# Patient Record
Sex: FEMALE | Race: WHITE | NOT HISPANIC OR LATINO | Employment: UNEMPLOYED | URBAN - METROPOLITAN AREA
[De-identification: names, ages, dates, MRNs, and addresses within clinical notes are randomized per-mention and may not be internally consistent; named-entity substitution may affect disease eponyms.]

---

## 2017-09-11 ENCOUNTER — OFFICE VISIT (OUTPATIENT)
Dept: FAMILY MEDICINE CLINIC | Facility: CLINIC | Age: 52
End: 2017-09-11

## 2017-09-11 VITALS
TEMPERATURE: 98 F | HEART RATE: 59 BPM | WEIGHT: 200.5 LBS | SYSTOLIC BLOOD PRESSURE: 122 MMHG | BODY MASS INDEX: 34.23 KG/M2 | DIASTOLIC BLOOD PRESSURE: 86 MMHG | HEIGHT: 64 IN | OXYGEN SATURATION: 98 %

## 2017-09-11 DIAGNOSIS — J45.20 MILD INTERMITTENT ASTHMA WITHOUT COMPLICATION: Primary | ICD-10-CM

## 2017-09-11 DIAGNOSIS — Z00.00 HEALTH CARE MAINTENANCE: ICD-10-CM

## 2017-09-11 PROCEDURE — 99213 OFFICE O/P EST LOW 20 MIN: CPT | Performed by: FAMILY MEDICINE

## 2017-09-11 RX ORDER — BUDESONIDE AND FORMOTEROL FUMARATE DIHYDRATE 160; 4.5 UG/1; UG/1
1 AEROSOL RESPIRATORY (INHALATION) 2 TIMES DAILY
Qty: 3 INHALER | Refills: 3 | Status: SHIPPED | OUTPATIENT
Start: 2017-09-11 | End: 2017-09-12 | Stop reason: SDUPTHER

## 2017-09-11 NOTE — PROGRESS NOTES
"Subjective   Jennifer Samayoa is a 51 y.o. female.     Chief Complaint   Patient presents with   • Asthma     medication refills pt not fasting        History of Present Illness    Long-standing mild intermittent asthma.  Since restarting the Symbicort she has had very little use of the Ventolin rescue inhaler.  Maybe 2 or 3 times a year.  She's taking one half of the Symbicort daily, only daily.  No recent flareups.  No direct exposure to secondhand smoke.  Patient has been a never smoker.  No oral thrush.  She follows with her gynecologist also      The following portions of the patient's history were reviewed and updated as appropriate: allergies, current medications, past family history, past medical history, past social history, past surgical history and problem list.          Review of Systems   Constitutional: Negative for fatigue and fever.   HENT: Negative.    Respiratory: Negative for cough and wheezing.    Gastrointestinal: Negative.    Skin: Negative for rash.       Objective   Blood pressure 122/86, pulse 59, temperature 98 °F (36.7 °C), temperature source Oral, height 64\" (162.6 cm), weight 200 lb 8 oz (90.9 kg), SpO2 98 %.  Physical Exam   Constitutional: She appears well-developed and well-nourished. No distress.   HENT:   Mouth/Throat: Oropharynx is clear and moist. No oropharyngeal exudate.   Neck: No thyromegaly present.   Cardiovascular: Normal rate, regular rhythm, normal heart sounds and intact distal pulses.    Pulmonary/Chest: Effort normal and breath sounds normal.   Musculoskeletal: She exhibits no edema.   Skin: Skin is warm and dry.   Psychiatric: She has a normal mood and affect. Her behavior is normal. Judgment and thought content normal.   Nursing note and vitals reviewed.      Assessment/Plan   Jennifer was seen today for asthma.    Diagnoses and all orders for this visit:    Mild intermittent asthma without complication    Health care maintenance  -     Comprehensive Metabolic Panel; " Future  -     Lipid Panel; Future  -     CBC & Differential; Future    Other orders  -     budesonide-formoterol (SYMBICORT) 160-4.5 MCG/ACT inhaler; Inhale 1 puff 2 (Two) Times a Day.      Mild intermittent asthma.  Stable.  Continue Symbicort daily.  Refill given.  Continue Ventolin as needed.  With excess rescue inhaler use, seek medical attention.    See me within 6-8 months for complete physical examination.  Lab work prior.  Patient also has a gynecologist with whom she will follow-up with.

## 2017-09-13 RX ORDER — BUDESONIDE AND FORMOTEROL FUMARATE DIHYDRATE 160; 4.5 UG/1; UG/1
1 AEROSOL RESPIRATORY (INHALATION) 2 TIMES DAILY
Qty: 3 INHALER | Refills: 3 | Status: SHIPPED | OUTPATIENT
Start: 2017-09-13 | End: 2018-09-25 | Stop reason: SDUPTHER

## 2017-09-16 ENCOUNTER — RESULTS ENCOUNTER (OUTPATIENT)
Dept: FAMILY MEDICINE CLINIC | Facility: CLINIC | Age: 52
End: 2017-09-16

## 2017-09-16 DIAGNOSIS — Z00.00 HEALTH CARE MAINTENANCE: ICD-10-CM

## 2017-09-18 LAB
ALBUMIN SERPL-MCNC: 4.4 G/DL (ref 3.5–5.2)
ALBUMIN/GLOB SERPL: 1.4 G/DL
ALP SERPL-CCNC: 109 U/L (ref 39–117)
ALT SERPL-CCNC: 18 U/L (ref 1–33)
AST SERPL-CCNC: 18 U/L (ref 1–32)
BASOPHILS # BLD AUTO: 0.02 10*3/MM3 (ref 0–0.2)
BASOPHILS NFR BLD AUTO: 0.3 % (ref 0–1.5)
BILIRUB SERPL-MCNC: 0.6 MG/DL (ref 0.1–1.2)
BUN SERPL-MCNC: 16 MG/DL (ref 6–20)
BUN/CREAT SERPL: 18.4 (ref 7–25)
CALCIUM SERPL-MCNC: 10.1 MG/DL (ref 8.6–10.5)
CHLORIDE SERPL-SCNC: 104 MMOL/L (ref 98–107)
CHOLEST SERPL-MCNC: 207 MG/DL (ref 0–200)
CO2 SERPL-SCNC: 24.5 MMOL/L (ref 22–29)
CREAT SERPL-MCNC: 0.87 MG/DL (ref 0.57–1)
EOSINOPHIL # BLD AUTO: 0.39 10*3/MM3 (ref 0–0.7)
EOSINOPHIL NFR BLD AUTO: 6.3 % (ref 0.3–6.2)
ERYTHROCYTE [DISTWIDTH] IN BLOOD BY AUTOMATED COUNT: 14.7 % (ref 11.7–13)
GLOBULIN SER CALC-MCNC: 3.1 GM/DL
GLUCOSE SERPL-MCNC: 87 MG/DL (ref 65–99)
HCT VFR BLD AUTO: 39.9 % (ref 35.6–45.5)
HDLC SERPL-MCNC: 47 MG/DL (ref 40–60)
HGB BLD-MCNC: 12.5 G/DL (ref 11.9–15.5)
IMM GRANULOCYTES # BLD: 0 10*3/MM3 (ref 0–0.03)
IMM GRANULOCYTES NFR BLD: 0 % (ref 0–0.5)
LDLC SERPL CALC-MCNC: 139 MG/DL (ref 0–100)
LYMPHOCYTES # BLD AUTO: 1.98 10*3/MM3 (ref 0.9–4.8)
LYMPHOCYTES NFR BLD AUTO: 31.7 % (ref 19.6–45.3)
MCH RBC QN AUTO: 26.8 PG (ref 26.9–32)
MCHC RBC AUTO-ENTMCNC: 31.3 G/DL (ref 32.4–36.3)
MCV RBC AUTO: 85.4 FL (ref 80.5–98.2)
MONOCYTES # BLD AUTO: 0.48 10*3/MM3 (ref 0.2–1.2)
MONOCYTES NFR BLD AUTO: 7.7 % (ref 5–12)
NEUTROPHILS # BLD AUTO: 3.37 10*3/MM3 (ref 1.9–8.1)
NEUTROPHILS NFR BLD AUTO: 54 % (ref 42.7–76)
PLATELET # BLD AUTO: 224 10*3/MM3 (ref 140–500)
POTASSIUM SERPL-SCNC: 4.3 MMOL/L (ref 3.5–5.2)
PROT SERPL-MCNC: 7.5 G/DL (ref 6–8.5)
RBC # BLD AUTO: 4.67 10*6/MM3 (ref 3.9–5.2)
SODIUM SERPL-SCNC: 142 MMOL/L (ref 136–145)
TRIGL SERPL-MCNC: 105 MG/DL (ref 0–150)
VLDLC SERPL CALC-MCNC: 21 MG/DL (ref 5–40)
WBC # BLD AUTO: 6.24 10*3/MM3 (ref 4.5–10.7)

## 2017-09-19 NOTE — PROGRESS NOTES
Lab work looks good.  No diabetes.  Cholesterol overall fine.  We will discuss more at upcoming visit a few weeks

## 2017-10-24 ENCOUNTER — OFFICE VISIT (OUTPATIENT)
Dept: OBSTETRICS AND GYNECOLOGY | Age: 52
End: 2017-10-24

## 2017-10-24 VITALS
DIASTOLIC BLOOD PRESSURE: 68 MMHG | SYSTOLIC BLOOD PRESSURE: 124 MMHG | WEIGHT: 199.8 LBS | BODY MASS INDEX: 35.4 KG/M2 | HEIGHT: 63 IN

## 2017-10-24 DIAGNOSIS — Z00.00 ANNUAL PHYSICAL EXAM: Primary | ICD-10-CM

## 2017-10-24 DIAGNOSIS — Z11.51 SPECIAL SCREENING EXAMINATION FOR HUMAN PAPILLOMAVIRUS (HPV): ICD-10-CM

## 2017-10-24 DIAGNOSIS — Z12.4 ROUTINE CERVICAL SMEAR: ICD-10-CM

## 2017-10-24 PROCEDURE — 99396 PREV VISIT EST AGE 40-64: CPT | Performed by: OBSTETRICS & GYNECOLOGY

## 2017-10-24 NOTE — PROGRESS NOTES
Subjective   Jennifer Samayoa is a 51 y.o. female is being seen today for an annual exam.  Chief Complaint   Patient presents with   • Gynecologic Exam   .    History of Present Illness  Patient is here for an annual check after 2 years.  She doing well overall is having no bleeding in menopause is not a problem at this time.  As far symptoms.  She does have a left knee partial replacement for arthritis otherwise no other complaints.  No new family history.  Her bowels and bladder are doing well.  The following portions of the patient's history were reviewed and updated as appropriate: allergies, current medications, past family history, past medical history, past social history, past surgical history and problem list.    Vitals:    10/24/17 1054   BP: 124/68       PAST MEDICAL HISTORY  Past Medical History:   Diagnosis Date   • Asthma    • Atopic rhinitis    • Osteoarthritis of knee      OB History      Para Term  AB Living    2 2 2       SAB TAB Ectopic Multiple Live Births                Past Surgical History:   Procedure Laterality Date   • ADENOIDECTOMY     • COLONOSCOPY     • KNEE ARTHROPLASTY, PARTIAL REPLACEMENT      left knee   • TONSILLECTOMY       Family History   Problem Relation Age of Onset   • Heart attack Mother    • Cancer Father    • No Known Problems Sister    • No Known Problems Brother    • No Known Problems Daughter    • No Known Problems Son    • No Known Problems Maternal Grandmother    • No Known Problems Paternal Grandmother    • No Known Problems Maternal Aunt    • No Known Problems Paternal Aunt    • BRCA 1/2 Neg Hx    • Breast cancer Neg Hx    • Colon cancer Neg Hx    • Endometrial cancer Neg Hx    • Ovarian cancer Neg Hx      History   Smoking Status   • Never Smoker   Smokeless Tobacco   • Never Used       Current Outpatient Prescriptions:   •  budesonide-formoterol (SYMBICORT) 160-4.5 MCG/ACT inhaler, Inhale 1 puff 2 (Two) Times a Day., Disp: 3 inhaler, Rfl: 3  •   fluticasone (FLONASE) 50 MCG/ACT nasal spray, into each nostril daily., Disp: , Rfl:   •  meloxicam (MOBIC) 15 MG tablet, , Disp: , Rfl: 11  •  VENTOLIN  (90 BASE) MCG/ACT inhaler, USE 1 INHALATION EVERY 4 HOURS AS NEEDED, Disp: 1 inhaler, Rfl: 1  Immunization History   Administered Date(s) Administered   • Influenza, Quadrivalent 09/17/2014       Review of Systems   Constitutional: Negative for chills, fatigue, fever and unexpected weight change.   Respiratory: Negative for shortness of breath and wheezing.    Cardiovascular: Negative for chest pain.   Gastrointestinal: Negative for abdominal distention, abdominal pain, blood in stool, constipation, diarrhea and nausea.   Genitourinary: Negative for difficulty urinating, dyspareunia, dysuria, frequency, hematuria, menstrual problem, pelvic pain, urgency and vaginal discharge.   Skin: Negative for rash.       Objective   Physical Exam   Constitutional: She is oriented to person, place, and time. Vital signs are normal. She appears well-developed and well-nourished.   Neck: No thyromegaly present.   Cardiovascular: Normal rate, regular rhythm and normal heart sounds.    Pulmonary/Chest: Effort normal. Right breast exhibits no inverted nipple, no mass, no nipple discharge, no skin change and no tenderness. Left breast exhibits no inverted nipple, no mass, no nipple discharge, no skin change and no tenderness. Breasts are symmetrical. There is no breast swelling.   Abdominal: Soft.   Genitourinary: Vagina normal and uterus normal. No breast tenderness, discharge or bleeding. Pelvic exam was performed with patient supine. No labial fusion. There is no rash, tenderness, lesion or injury on the right labia. There is no rash, tenderness, lesion or injury on the left labia. Cervix exhibits no motion tenderness, no discharge and no friability. Right adnexum displays no mass, no tenderness and no fullness. Left adnexum displays no mass, no tenderness and no fullness.    Neurological: She is alert and oriented to person, place, and time.   Psychiatric: She has a normal mood and affect.   Vitals reviewed.        Assessment/Plan   Jennifer was seen today for gynecologic exam.    Diagnoses and all orders for this visit:    Annual physical exam  Normal exam today.  Pap smear was done today.  Mammogram was done today.  Colonoscopy was 2 years ago she good for 10 years and we'll do bone density next year.  Diet and excise were discussed come back in year

## 2017-10-26 LAB
CYTOLOGIST CVX/VAG CYTO: NORMAL
CYTOLOGY CVX/VAG DOC THIN PREP: NORMAL
DX ICD CODE: NORMAL
HIV 1 & 2 AB SER-IMP: NORMAL
HPV I/H RISK 4 DNA CVX QL PROBE+SIG AMP: NEGATIVE
OTHER STN SPEC: NORMAL
PATH REPORT.FINAL DX SPEC: NORMAL
STAT OF ADQ CVX/VAG CYTO-IMP: NORMAL

## 2018-09-25 ENCOUNTER — OFFICE VISIT (OUTPATIENT)
Dept: FAMILY MEDICINE CLINIC | Facility: CLINIC | Age: 53
End: 2018-09-25

## 2018-09-25 VITALS
HEART RATE: 81 BPM | BODY MASS INDEX: 37.03 KG/M2 | HEIGHT: 63 IN | WEIGHT: 209 LBS | DIASTOLIC BLOOD PRESSURE: 83 MMHG | SYSTOLIC BLOOD PRESSURE: 129 MMHG | TEMPERATURE: 97.6 F | OXYGEN SATURATION: 98 %

## 2018-09-25 DIAGNOSIS — M17.11 PRIMARY OSTEOARTHRITIS OF RIGHT KNEE: ICD-10-CM

## 2018-09-25 DIAGNOSIS — Z23 NEED FOR IMMUNIZATION AGAINST INFLUENZA: ICD-10-CM

## 2018-09-25 DIAGNOSIS — J45.20 MILD INTERMITTENT ASTHMA WITHOUT COMPLICATION: Primary | ICD-10-CM

## 2018-09-25 PROCEDURE — 99213 OFFICE O/P EST LOW 20 MIN: CPT | Performed by: FAMILY MEDICINE

## 2018-09-25 RX ORDER — BUDESONIDE AND FORMOTEROL FUMARATE DIHYDRATE 160; 4.5 UG/1; UG/1
1 AEROSOL RESPIRATORY (INHALATION)
Qty: 3 INHALER | Refills: 3 | Status: SHIPPED | OUTPATIENT
Start: 2018-09-25 | End: 2020-11-06

## 2018-09-25 NOTE — PROGRESS NOTES
"Subjective   Jennifer Samayoa is a 52 y.o. female.     Chief Complaint   Patient presents with   • Asthma     had labs done   • Knee Pain     would like a referal to an ortho bilateral         History of Present Illness    Asthma.  Intermittent.  Stable.  Since starting the Symbicort, 1 puff daily, she has not had any asthma flareups.  She has not needed her Ventolin rescue inhaler in some time.  No recent wheezing.  No shortness of breath.  No oral thrush symptoms.    Osteoarthritis of both knees.  She had a partial knee replacement reportedly on the left.  With that orthopedic doctor reportedly passed away.  She's having no right knee trouble.  She is interested in orthopedic referral.      The following portions of the patient's history were reviewed and updated as appropriate: allergies, current medications, past family history, past medical history, past social history, past surgical history and problem list.          Review of Systems   Constitutional: Negative for fatigue and fever.   Respiratory: Negative for cough and shortness of breath.    Gastrointestinal: Negative.    Musculoskeletal: Positive for arthralgias. Negative for joint swelling.   Skin: Negative for rash.       Objective   Blood pressure 129/83, pulse 81, temperature 97.6 °F (36.4 °C), temperature source Oral, height 160 cm (62.99\"), weight 94.8 kg (209 lb), SpO2 98 %.  Physical Exam   Constitutional: She appears well-developed and well-nourished. No distress.   Neck: No thyromegaly present.   Cardiovascular: Normal rate, regular rhythm, normal heart sounds and intact distal pulses.    Pulmonary/Chest: Effort normal and breath sounds normal.   Musculoskeletal: Normal range of motion.   Skin: Skin is warm and dry.   Psychiatric: She has a normal mood and affect. Her behavior is normal. Judgment and thought content normal.   Nursing note and vitals reviewed.      Assessment/Plan   Jennifer was seen today for asthma and knee pain.    Diagnoses and " all orders for this visit:    Mild intermittent asthma without complication    Need for immunization against influenza  -     Flucelvax Quad (Vial) =>4 yrs ([id number])    Primary osteoarthritis of right knee  -     Ambulatory Referral to Orthopedic Surgery    Other orders  -     budesonide-formoterol (SYMBICORT) 160-4.5 MCG/ACT inhaler; Inhale 1 puff 2 (Two) Times a Day.      Mild intermittent asthma.  No recent flareups.  Continue Symbicort.  Refill given.  She has a Ventolin she can use if needed.  With needing Ventolin more than a few times a week, she'll need to see me for further evaluation.    She continues to follow with her gynecologist.  Colonoscopy and mammograms up-to-date.    Osteoarthritis of the right knee.  Worsening pain.  Referral to orthopedic surgery.

## 2018-10-25 ENCOUNTER — OFFICE VISIT (OUTPATIENT)
Dept: ORTHOPEDIC SURGERY | Facility: CLINIC | Age: 53
End: 2018-10-25

## 2018-10-25 VITALS
BODY MASS INDEX: 36.32 KG/M2 | SYSTOLIC BLOOD PRESSURE: 118 MMHG | HEIGHT: 63 IN | DIASTOLIC BLOOD PRESSURE: 80 MMHG | WEIGHT: 205 LBS

## 2018-10-25 DIAGNOSIS — M17.11 PRIMARY OSTEOARTHRITIS OF RIGHT KNEE: ICD-10-CM

## 2018-10-25 DIAGNOSIS — R52 PAIN: Primary | ICD-10-CM

## 2018-10-25 PROCEDURE — 20610 DRAIN/INJ JOINT/BURSA W/O US: CPT | Performed by: ORTHOPAEDIC SURGERY

## 2018-10-25 PROCEDURE — 99203 OFFICE O/P NEW LOW 30 MIN: CPT | Performed by: ORTHOPAEDIC SURGERY

## 2018-10-25 PROCEDURE — 73562 X-RAY EXAM OF KNEE 3: CPT | Performed by: ORTHOPAEDIC SURGERY

## 2018-10-25 RX ADMIN — LIDOCAINE HYDROCHLORIDE 8 ML: 10 INJECTION, SOLUTION EPIDURAL; INFILTRATION; INTRACAUDAL; PERINEURAL at 13:47

## 2018-10-25 RX ADMIN — TRIAMCINOLONE ACETONIDE 80 MG: 40 INJECTION, SUSPENSION INTRA-ARTICULAR; INTRAMUSCULAR at 13:47

## 2018-11-08 RX ORDER — TRIAMCINOLONE ACETONIDE 40 MG/ML
80 INJECTION, SUSPENSION INTRA-ARTICULAR; INTRAMUSCULAR
Status: COMPLETED | OUTPATIENT
Start: 2018-10-25 | End: 2018-10-25

## 2018-11-08 RX ORDER — LIDOCAINE HYDROCHLORIDE 10 MG/ML
8 INJECTION, SOLUTION EPIDURAL; INFILTRATION; INTRACAUDAL; PERINEURAL
Status: COMPLETED | OUTPATIENT
Start: 2018-10-25 | End: 2018-10-25

## 2018-12-06 ENCOUNTER — OFFICE VISIT (OUTPATIENT)
Dept: ORTHOPEDIC SURGERY | Facility: CLINIC | Age: 53
End: 2018-12-06

## 2018-12-06 VITALS — BODY MASS INDEX: 35.61 KG/M2 | WEIGHT: 201 LBS | HEIGHT: 63 IN

## 2018-12-06 DIAGNOSIS — T84.84XA PAIN DUE TO TOTAL LEFT KNEE REPLACEMENT, INITIAL ENCOUNTER (HCC): ICD-10-CM

## 2018-12-06 DIAGNOSIS — R52 PAIN: Primary | ICD-10-CM

## 2018-12-06 DIAGNOSIS — Z96.652 PAIN DUE TO TOTAL LEFT KNEE REPLACEMENT, INITIAL ENCOUNTER (HCC): ICD-10-CM

## 2018-12-06 DIAGNOSIS — M17.11 PRIMARY OSTEOARTHRITIS OF RIGHT KNEE: ICD-10-CM

## 2018-12-06 PROCEDURE — 99213 OFFICE O/P EST LOW 20 MIN: CPT | Performed by: ORTHOPAEDIC SURGERY

## 2018-12-06 PROCEDURE — 73562 X-RAY EXAM OF KNEE 3: CPT | Performed by: ORTHOPAEDIC SURGERY

## 2018-12-06 RX ORDER — SODIUM PHOSPHATE,MONO-DIBASIC 19G-7G/118
ENEMA (ML) RECTAL
COMMUNITY
End: 2023-03-13

## 2018-12-06 NOTE — PROGRESS NOTES
Subjective:     Patient ID: Jennifer Samayoa is a 52 y.o. female.    Chief Complaint:  Follow-up right knee pain, DJD.  New issue, left knee pain, prior arthroplasty  History of Present Illness  Jennifer Samayoa returns to clinic today for evaluation of right knee, states is doing very well this point time with good improvement with intra-articular injection, minimal residual pain rates as a 1-2 out of 10 over medial aspect of knee.    Regards to her left knee, she states that she had a partial medial arthroplasty done approximately 3-4 years ago by Dr. Palmer, did fairly well initially but over the last 4-6 months she's noticed some increasing levels of pain particularly over the medial aspect of the knee, denies any locking or catching the knee, rates associated pain as a 4-6 out of 10 in aching in nature, does wax and wane.  Denies fevers chills or sweats, denies redness or warmth, occasional swelling.  Denies radiation of pain, denies any associated numbness or tingling left lower extremity.     Social History     Occupational History   • Not on file   Tobacco Use   • Smoking status: Never Smoker   • Smokeless tobacco: Never Used   Substance and Sexual Activity   • Alcohol use: Yes     Comment: occ   • Drug use: No   • Sexual activity: Not on file      Past Medical History:   Diagnosis Date   • Asthma    • Atopic rhinitis    • Osteoarthritis of knee      Past Surgical History:   Procedure Laterality Date   • ADENOIDECTOMY     • COLONOSCOPY     • JOINT REPLACEMENT      partial left knee   • KNEE ARTHROPLASTY, PARTIAL REPLACEMENT      left knee   • TONSILLECTOMY         Family History   Problem Relation Age of Onset   • Heart attack Mother    • Cancer Father    • No Known Problems Sister    • No Known Problems Brother    • No Known Problems Daughter    • No Known Problems Son    • No Known Problems Maternal Grandmother    • No Known Problems Paternal Grandmother    • No Known Problems Maternal Aunt    • No Known  "Problems Paternal Aunt    • BRCA 1/2 Neg Hx    • Breast cancer Neg Hx    • Colon cancer Neg Hx    • Endometrial cancer Neg Hx    • Ovarian cancer Neg Hx          Review of Systems   Constitutional: Negative for chills, diaphoresis, fever and unexpected weight change.   HENT: Negative for hearing loss, nosebleeds, sore throat and tinnitus.    Eyes: Negative for pain and visual disturbance.   Respiratory: Negative for cough, shortness of breath and wheezing.    Cardiovascular: Negative for chest pain and palpitations.   Gastrointestinal: Negative for abdominal pain, diarrhea, nausea and vomiting.   Endocrine: Negative for cold intolerance, heat intolerance and polydipsia.   Genitourinary: Negative for difficulty urinating, dysuria and hematuria.   Musculoskeletal: Positive for arthralgias. Negative for joint swelling and myalgias.   Skin: Negative for rash and wound.   Allergic/Immunologic: Negative for environmental allergies.   Neurological: Negative for dizziness, syncope and numbness.   Hematological: Does not bruise/bleed easily.   Psychiatric/Behavioral: Negative for dysphoric mood and sleep disturbance. The patient is not nervous/anxious.            Objective:  Vitals:    12/06/18 1536   Weight: 91.2 kg (201 lb)   Height: 160 cm (63\")         12/06/18  1536   Weight: 91.2 kg (201 lb)     Body mass index is 35.61 kg/m².  General: No acute distress.  Resp: normal respiratory effort  Skin: no rashes or wounds; normal turgor  Psych: mood and affect appropriate; recent and remote memory intact          Ortho Exam     Right knee-active range of motion 0-125°, 4+ out of 5 strength on flexion and extension, minimal effusion, minimal tenderness along medial joint line and medial and lateral patellar facet.    Left knee-anterior medial incision well-healed, overall slight varus alignment, mild tenderness along medial joint line, moderate tenderness along medial lateral patellar facet with positive active patellar " compression test, active range of motion 0-125°, 4+ out of 5 strength on flexion and extension.  Stable to varus and valgus stress at 0 and 30°.  No left hip pain on logroll or Stinchfield exam, negative straight leg raise left lower extremity.    Imaging:  Left Knee X-Ray  Indication: Pain    AP, Lateral, and [place] views    Findings:  No fracture  Medial compartment partial arthroplasty noted, no tamika loosening or ostial lysis, reactive bone formation along medial proximal tibia appreciated, slight overall varus alignment noted.    No prior studies were available for comparison.    Assessment:        1. Pain    2. Primary osteoarthritis of right knee    3. Pain due to total left knee replacement, initial encounter (CMS/[facility])           Plan:          Discussed treatment options at length with patient at today's visit.  Overall patient is doing much better with her right knee, still having some occasional pain on her left particularly with work related activities.  She states the pain is not bad enough at this point time to consider intra-articular injections, she will continue with anti-inflammatory medications over-the-counter, activity modification, weight loss.    Jennifer Samayoa was in agreement with plan and had all questions answered.     Orders:  Orders Placed This Encounter   Procedures   • XR Knee 3+ View With [place] Left       Medications:  No orders of the defined types were placed in this encounter.      Followup:  Return in about 3 months (around 3/6/2019).    Jennifer was seen today for follow-up and pain.    Diagnoses and all orders for this visit:    Pain  -     XR Knee 3+ View With [place] Left    Primary osteoarthritis of right knee    Pain due to total left knee replacement, initial encounter (CMS/[facility])          Dictated utilizing Dragon dictation

## 2020-10-19 ENCOUNTER — OFFICE VISIT (OUTPATIENT)
Dept: FAMILY MEDICINE CLINIC | Facility: CLINIC | Age: 55
End: 2020-10-19

## 2020-10-19 VITALS
TEMPERATURE: 97.3 F | HEIGHT: 63 IN | BODY MASS INDEX: 36.82 KG/M2 | DIASTOLIC BLOOD PRESSURE: 87 MMHG | WEIGHT: 207.8 LBS | OXYGEN SATURATION: 98 % | HEART RATE: 75 BPM | SYSTOLIC BLOOD PRESSURE: 153 MMHG

## 2020-10-19 DIAGNOSIS — Z00.00 HEALTH CARE MAINTENANCE: ICD-10-CM

## 2020-10-19 DIAGNOSIS — J45.20 MILD INTERMITTENT ASTHMA WITHOUT COMPLICATION: Primary | ICD-10-CM

## 2020-10-19 PROCEDURE — 99213 OFFICE O/P EST LOW 20 MIN: CPT | Performed by: FAMILY MEDICINE

## 2020-10-19 RX ORDER — ALBUTEROL SULFATE 90 UG/1
2 AEROSOL, METERED RESPIRATORY (INHALATION) EVERY 4 HOURS PRN
Qty: 18 G | Refills: 1 | Status: SHIPPED | OUTPATIENT
Start: 2020-10-19 | End: 2020-10-21 | Stop reason: SDUPTHER

## 2020-10-19 NOTE — PROGRESS NOTES
"Subjective   Jennifer Samayoa is a 54 y.o. female.     Chief Complaint   Patient presents with   • Asthma     med refill         History of Present Illness    Follow-up mild intermittent asthma without complication.  She is needed to use an inhaler about 5 times in the last 10 months.  Once after being exposed to cleaning fluids.  She ran out of her albuterol rescue inhaler number of months ago.  She has been taking Symbicort on very rare occasions.  She is use it 5 times this year.  She states she will have wheezing occasionally.  But not very often.  And on her recent weeks.  Non-smoker.  No secondhand exposure.  She works in a nursing home.  She gets tested for COVID-19 weekly.  She has no known COVID-19 exposure.      The following portions of the patient's history were reviewed and updated as appropriate: allergies, current medications, past family history, past medical history, past social history, past surgical history and problem list.          Review of Systems   Constitutional: Negative.    HENT: Negative.    Respiratory: Negative for shortness of breath.        Objective   Blood pressure 153/87, pulse 75, temperature 97.3 °F (36.3 °C), temperature source Temporal, height 160 cm (63\"), weight 94.3 kg (207 lb 12.8 oz), SpO2 98 %.  Physical Exam  Constitutional:       Appearance: Normal appearance.   HENT:      Head: Atraumatic.   Neck:      Musculoskeletal: Normal range of motion and neck supple. No muscular tenderness.   Cardiovascular:      Pulses: Normal pulses.   Pulmonary:      Effort: Pulmonary effort is normal.      Breath sounds: Normal breath sounds. No wheezing.   Neurological:      Mental Status: She is alert.   Psychiatric:         Mood and Affect: Mood normal.         Behavior: Behavior normal.         Assessment/Plan   Diagnoses and all orders for this visit:    1. Mild intermittent asthma without complication (Primary)    2. Health care maintenance  -     CBC & Differential; Future  -     " Comprehensive Metabolic Panel; Future  -     Lipid Panel; Future    Other orders  -     albuterol sulfate  (90 Base) MCG/ACT inhaler; Inhale 2 puffs Every 4 (Four) Hours As Needed for Wheezing.  Dispense: 18 g; Refill: 1        Mild intermittent asthma without complication.  This time I do not recommend using the Symbicort.  She is not having enough asthma symptoms to indicate a daily steroid long-acting bronchodilator combo.  However there are some newer studies that suggest that using these combos on a as needed basis may work.  However it is very expensive for her.  It cost $400.  Instead I am recommending albuterol rescue inhaler 2 puffs up to 4 times a day as needed.  However if she needs to use it more than 2 or 3 times a week, she is going to let us know and then we would then start her on a preventative steroid inhaler.  Otherwise I will see her back in about 6 months for recheck and annual wellness visit.  She will call with questions.

## 2020-10-21 ENCOUNTER — TELEPHONE (OUTPATIENT)
Dept: FAMILY MEDICINE CLINIC | Facility: CLINIC | Age: 55
End: 2020-10-21

## 2020-10-21 RX ORDER — ALBUTEROL SULFATE 90 UG/1
2 AEROSOL, METERED RESPIRATORY (INHALATION) EVERY 4 HOURS PRN
Qty: 18 G | Refills: 1 | Status: SHIPPED | OUTPATIENT
Start: 2020-10-21 | End: 2020-10-23 | Stop reason: SDUPTHER

## 2020-10-21 NOTE — TELEPHONE ENCOUNTER
Patient called in stating the [facility] has not received the Rx for albuterol sulfate  (90 Base) MCG/ACT inhale    Please call back to advise     Best call back # [phone]

## 2020-10-23 RX ORDER — ALBUTEROL SULFATE 90 UG/1
2 AEROSOL, METERED RESPIRATORY (INHALATION) EVERY 4 HOURS PRN
Qty: 18 G | Refills: 1 | Status: SHIPPED | OUTPATIENT
Start: 2020-10-23 | End: 2020-11-06

## 2020-10-27 RX ORDER — ALBUTEROL SULFATE 90 UG/1
2 AEROSOL, METERED RESPIRATORY (INHALATION) EVERY 4 HOURS PRN
Qty: 18 G | Refills: 1 | Status: SHIPPED | OUTPATIENT
Start: 2020-10-27 | End: 2021-12-06 | Stop reason: SDUPTHER

## 2020-11-06 ENCOUNTER — OFFICE VISIT (OUTPATIENT)
Dept: ORTHOPEDIC SURGERY | Facility: CLINIC | Age: 55
End: 2020-11-06

## 2020-11-06 VITALS — WEIGHT: 206 LBS | BODY MASS INDEX: 36.5 KG/M2 | HEIGHT: 63 IN

## 2020-11-06 DIAGNOSIS — T84.84XA PAIN DUE TO TOTAL LEFT KNEE REPLACEMENT, INITIAL ENCOUNTER (HCC): ICD-10-CM

## 2020-11-06 DIAGNOSIS — M17.11 PRIMARY OSTEOARTHRITIS OF RIGHT KNEE: Primary | ICD-10-CM

## 2020-11-06 DIAGNOSIS — Z96.652 PAIN DUE TO TOTAL LEFT KNEE REPLACEMENT, INITIAL ENCOUNTER (HCC): ICD-10-CM

## 2020-11-06 PROCEDURE — 99213 OFFICE O/P EST LOW 20 MIN: CPT | Performed by: ORTHOPAEDIC SURGERY

## 2020-11-06 PROCEDURE — 73562 X-RAY EXAM OF KNEE 3: CPT | Performed by: ORTHOPAEDIC SURGERY

## 2020-11-06 PROCEDURE — 20610 DRAIN/INJ JOINT/BURSA W/O US: CPT | Performed by: ORTHOPAEDIC SURGERY

## 2020-11-06 RX ADMIN — TRIAMCINOLONE ACETONIDE 80 MG: 40 INJECTION, SUSPENSION INTRA-ARTICULAR; INTRAMUSCULAR at 11:49

## 2020-11-06 RX ADMIN — LIDOCAINE HYDROCHLORIDE 8 ML: 10 INJECTION, SOLUTION EPIDURAL; INFILTRATION; INTRACAUDAL; PERINEURAL at 11:49

## 2020-11-06 NOTE — PROGRESS NOTES
Subjective:     Patient ID: Jennifer Samayoa is a 54 y.o. female.    Chief Complaint: Bilateral knee pain, new exacerbation  Right knee DJD, status post left medial unicompartmental arthroplasty by Dr. Garcia    History of Present Illness  Jennifer Samayoa returns to clinic today for evaluation of her bilareral knees, right worse than left. She states that her pain began to reoccur 4-6 weeks ago, and has continued since that time. She rates the pain as 9/10, describes it as aching in nature. Localizes pain to medial joint line. Has noted improvement with supplements including glucosamine, tumeric, and jasen. Symptoms are exacerbated with prolonged standing. Denies radiation of pain, denies associated numbness or tingling.       Social History     Occupational History   • Not on file   Tobacco Use   • Smoking status: Never Smoker   • Smokeless tobacco: Never Used   Substance and Sexual Activity   • Alcohol use: Yes     Comment: occ   • Drug use: No   • Sexual activity: Defer      Past Medical History:   Diagnosis Date   • Asthma    • Atopic rhinitis    • Osteoarthritis of knee      Past Surgical History:   Procedure Laterality Date   • ADENOIDECTOMY     • COLONOSCOPY     • JOINT REPLACEMENT      partial left knee   • KNEE ARTHROPLASTY, PARTIAL REPLACEMENT      left knee   • TONSILLECTOMY         Family History   Problem Relation Age of Onset   • Heart attack Mother    • Cancer Father    • No Known Problems Sister    • No Known Problems Brother    • No Known Problems Daughter    • No Known Problems Son    • No Known Problems Maternal Grandmother    • No Known Problems Paternal Grandmother    • No Known Problems Maternal Aunt    • No Known Problems Paternal Aunt    • BRCA 1/2 Neg Hx    • Breast cancer Neg Hx    • Colon cancer Neg Hx    • Endometrial cancer Neg Hx    • Ovarian cancer Neg Hx          Review of Systems   Constitutional: Negative for chills, diaphoresis, fever and unexpected weight change.   HENT: Negative  "for hearing loss, nosebleeds, sore throat and tinnitus.    Eyes: Negative for pain and visual disturbance.   Respiratory: Negative for cough, shortness of breath and wheezing.    Cardiovascular: Negative for chest pain and palpitations.   Gastrointestinal: Negative for abdominal pain, diarrhea, nausea and vomiting.   Endocrine: Negative for cold intolerance, heat intolerance and polydipsia.   Genitourinary: Negative for difficulty urinating, dysuria and hematuria.   Musculoskeletal: Positive for arthralgias and myalgias. Negative for joint swelling.   Skin: Negative for rash and wound.   Allergic/Immunologic: Negative for environmental allergies.   Neurological: Negative for dizziness, syncope and numbness.   Hematological: Does not bruise/bleed easily.   Psychiatric/Behavioral: Negative for dysphoric mood and sleep disturbance. The patient is not nervous/anxious.    Answers for HPI/ROS submitted by the patient on 10/30/2020   What is the primary reason for your visit?: Other  Please describe your symptoms.: My left knee which was partially replaced keeps locking and is very stiff. My right knee I am experiencing discomfort on the knee cap and behind the knee.  Have you had these symptoms before?: Yes  How long have you been having these symptoms?: Greater than 2 weeks          Objective:  Vitals:    11/06/20 1114   Weight: 93.4 kg (206 lb)   Height: 160 cm (63\")         11/06/20  1114   Weight: 93.4 kg (206 lb)     Body mass index is 36.49 kg/m².    Physical Exam    Vital signs reviewed.   General: No acute distress, alert and oriented  Eyes: conjunctiva clear; pupils equally round and reactive  ENT: external ears and nose atraumatic; oropharynx clear  CV: no peripheral edema  Resp: normal respiratory effort  Skin: no rashes or wounds; normal turgor  Psych: mood and affect appropriate; recent and remote memory intact        Ortho Exam       Bilateral Knee-    ROM 0-120 degrees  4+/5 on flexion  4+/5 on " extension  Maximal tenderness medial joint line and patellofemoral joint    Effusion- moderate   Active patellar compression test- positive     Positive sensation light tough all distributions symmetric to contralateral side  Brisk cap refill all digits  2+ dorsalis pedis pulse      Imaging:  Bilateral Knee X-Ray  Indication: Pain    AP, Lateral, and [place] views    Findings:  Advanced recommend osteoarthritis on the right with bone-on-bone articulation medial compartment and significant varus alignment as well as moderate patellofemoral joint space narrowing with reactive osteophyte formation noted.    Partial medial knee arthroplasty on the left with apparent medial subluxation of the femoral component on the tibial component, no evidence of tamika loosening on my review, reactive osteophyte formation along medial compartment noted.     Assessment:        1. Primary osteoarthritis of right knee    2. Pain due to partial left knee replacement, initial encounter (CMS/[facility])           Plan:  Large Joint Arthrocentesis  Date/Time: 11/6/2020 11:49 AM  Consent given by: patient  Site marked: site marked  Timeout: Immediately prior to procedure a time out was called to verify the correct patient, procedure, equipment, support staff and site/side marked as required   Supporting Documentation  Indications: pain   Procedure Details  Location: knee - Knee joint: BILATERAL KNEE.  Preparation: Patient was prepped and draped in the usual sterile fashion  Needle size: 22 G  Approach: superior (LATERAL)  Medications administered: 8 mL lidocaine PF 1% 1 %; 80 mg triamcinolone acetonide 40 MG/ML  Patient tolerance: patient tolerated the procedure well with no immediate complications                1. Discussed treatment options at length with patient at today's visit.   2. Patient would like to proceed with cortisone injection today to the bilateral knees. Recommended limited use of affected extremity for the next 24 hours to only  essential activites other than work on general active and passive motion. Recommended supplementing with ice and soft tissue massage. Discussed with patient that they should see results in 5-7 days, if no improvement in 5-6 weeks I have asked them to call the office to review other options. Patient should call office immediately if they notice redness, warmth, fevers, chills, or residual numbness or tingling for greater than 6 hours after injection.   3. Follow-up with me in 3 months       Jennifer Samayoa was in agreement with plan and had all questions answered.     Orders:  Orders Placed This Encounter   Procedures   • Large Joint Arthrocentesis   • XR Knee 3 View Bilateral       Medications:  No orders of the defined types were placed in this encounter.      Followup:  Return in about 3 months (around 2/6/2021).    Diagnoses and all orders for this visit:    1. Primary osteoarthritis of right knee (Primary)  -     XR Knee 3 View Bilateral    2. Pain due to partial left knee replacement, initial encounter (CMS/[facility])  -     XR Knee 3 View Bilateral    Other orders  -     Large Joint Arthrocentesis           By signing my name here, I Sandy Wang attest that all documentation on 11/10/20 at 08:03 EST has been prepared under the direction and in the presence of Dr. Arias Garcia MD.    I, Dr. Arias Garcia, personally performed the services described in this documentation, as scribed by Sandy Wang, in my presence, and it is both accurate and complete.        Dictated utilizing Dragon dictation

## 2020-11-10 RX ORDER — TRIAMCINOLONE ACETONIDE 40 MG/ML
80 INJECTION, SUSPENSION INTRA-ARTICULAR; INTRAMUSCULAR
Status: COMPLETED | OUTPATIENT
Start: 2020-11-06 | End: 2020-11-06

## 2020-11-10 RX ORDER — LIDOCAINE HYDROCHLORIDE 10 MG/ML
8 INJECTION, SOLUTION EPIDURAL; INFILTRATION; INTRACAUDAL; PERINEURAL
Status: COMPLETED | OUTPATIENT
Start: 2020-11-06 | End: 2020-11-06

## 2021-07-15 RX ORDER — AMOXICILLIN 500 MG/1
CAPSULE ORAL
Qty: 10 CAPSULE | Refills: 0 | Status: SHIPPED | OUTPATIENT
Start: 2021-07-15 | End: 2021-09-08

## 2021-09-08 ENCOUNTER — OFFICE VISIT (OUTPATIENT)
Dept: ORTHOPEDIC SURGERY | Facility: CLINIC | Age: 56
End: 2021-09-08

## 2021-09-08 VITALS — HEIGHT: 65 IN | WEIGHT: 190 LBS | BODY MASS INDEX: 31.65 KG/M2

## 2021-09-08 DIAGNOSIS — M17.11 PRIMARY OSTEOARTHRITIS OF RIGHT KNEE: Primary | ICD-10-CM

## 2021-09-08 DIAGNOSIS — Z96.652 PAIN DUE TO TOTAL LEFT KNEE REPLACEMENT, INITIAL ENCOUNTER (HCC): ICD-10-CM

## 2021-09-08 DIAGNOSIS — T84.84XA PAIN DUE TO TOTAL LEFT KNEE REPLACEMENT, INITIAL ENCOUNTER (HCC): ICD-10-CM

## 2021-09-08 PROCEDURE — 99213 OFFICE O/P EST LOW 20 MIN: CPT | Performed by: ORTHOPAEDIC SURGERY

## 2021-09-08 PROCEDURE — 73562 X-RAY EXAM OF KNEE 3: CPT | Performed by: ORTHOPAEDIC SURGERY

## 2021-09-08 PROCEDURE — 20610 DRAIN/INJ JOINT/BURSA W/O US: CPT | Performed by: ORTHOPAEDIC SURGERY

## 2021-09-08 RX ADMIN — TRIAMCINOLONE ACETONIDE 80 MG: 40 INJECTION, SUSPENSION INTRA-ARTICULAR; INTRAMUSCULAR at 09:29

## 2021-09-08 RX ADMIN — LIDOCAINE HYDROCHLORIDE 8 ML: 10 INJECTION, SOLUTION EPIDURAL; INFILTRATION; INTRACAUDAL; PERINEURAL at 09:29

## 2021-09-08 NOTE — PROGRESS NOTES
"Subjective:     Patient ID: Jennifer Samayoa is a 55 y.o. female.    Chief Complaint:  Follow-up right knee pain, DJD  History of Present Illness  Jennifer Samayoa returns to clinic today for evaluation of right knee pain.     The patient notes that her left knee is doing well, but her right knee is bothering her now. She rates her pain as a 3 out of 10 currently, but depending on her activity level, it can be at a 10 as well. 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 Her pain is localized medially. She denies any radiation, tingling, numbness or swelling. The only thing that has improved her symptoms is not going to work.     Social History     Occupational History   • Not on file   Tobacco Use   • Smoking status: Never Smoker   • Smokeless tobacco: Never Used   Vaping Use   • Vaping Use: Never used   Substance and Sexual Activity   • Alcohol use: Yes     Comment: occ   • Drug use: No   • Sexual activity: Defer      Past Medical History:   Diagnosis Date   • Asthma    • Atopic rhinitis    • Osteoarthritis of knee      Past Surgical History:   Procedure Laterality Date   • ADENOIDECTOMY     • COLONOSCOPY     • JOINT REPLACEMENT      partial left knee   • KNEE ARTHROPLASTY, PARTIAL REPLACEMENT      left knee   • TONSILLECTOMY         Family History   Problem Relation Age of Onset   • Heart attack Mother    • Cancer Father    • No Known Problems Sister    • No Known Problems Brother    • No Known Problems Daughter    • No Known Problems Son    • No Known Problems Maternal Grandmother    • No Known Problems Paternal Grandmother    • No Known Problems Maternal Aunt    • No Known Problems Paternal Aunt    • BRCA 1/2 Neg Hx    • Breast cancer Neg Hx    • Colon cancer Neg Hx    • Endometrial cancer Neg Hx    • Ovarian cancer Neg Hx          Review of Systems        Objective:  Vitals:    09/08/21 0834   Weight: 86.2 kg (190 lb)   Height: 165.1 cm (65\")         " 09/08/21  0834   Weight: 86.2 kg (190 lb)     Body mass index is 31.62 kg/m².  Physical Exam    Vital signs reviewed.   General: No acute distress, alert and oriented  Eyes: conjunctiva clear; pupils equally round and reactive  ENT: external ears and nose atraumatic; oropharynx clear  CV: no peripheral edema  Resp: normal respiratory effort  Skin: no rashes or wounds; normal turgor  Psych: mood and affect appropriate; recent and remote memory intact          Ortho Exam     Right Knee-    ROM 2 to 130 degrees  4+/5 on flexion  4+/5 on extension  Maximal tenderness medial joint line, moderate tenderness medial and lateral patellar facet. Moderate patellofemoral crepitus.    Effusion- moderate   Grade 1A Lachman  Anterior drawer- negative  Posterior drawer- negative   Stable opening on varus and valgus stress at 2 and 30  Active patellar compression test- positive     Log roll-  negative  Stinchfield-  negative      Positive sensation light tough all distributions symmetric to contralateral side  Brisk cap refill all digits  2+ dorsalis pedis pulse          Imaging:  Right Knee X-Ray  Indication: Pain    AP, Lateral, and [place] views    Findings:  No fracture  No bony lesion  Normal soft tissues  Advance tricompartmental osteoarthritis with bone-on-bone articulation and slight medial subluxation of the distal femur on the proximal tibia with involvement primarily of the medial and patellofemoral compartments with significant reactive osteophyte formation and overall varus alignment    Compared to prior office x-rays    Assessment:        1. Primary osteoarthritis of right knee    2. Pain due to partial left knee replacement, initial encounter (CMS/[facility])           Plan:  Large Joint Arthrocentesis: R knee  Date/Time: 9/8/2021 9:29 AM  Consent given by: patient  Site marked: site marked  Timeout: Immediately prior to procedure a time out was called to verify the correct patient, procedure, equipment, support staff and  site/side marked as required   Supporting Documentation  Indications: pain   Procedure Details  Location: knee - R knee  Preparation: Patient was prepped and draped in the usual sterile fashion  Needle size: 22 G  Approach: superior  Medications administered: 8 mL lidocaine PF 1% 1 %; 80 mg triamcinolone acetonide 40 MG/ML  Patient tolerance: patient tolerated the procedure well with no immediate complications                  1. Discussed treatment options at length with patient at today's visit.  2. The patient would like to go with the non-surgical route and try a corticosteroid injection today.  3. Patient would like to proceed with cortisone injection today to the right knee. Recommended limited use of affected extremity for the next 24 hours to only essential activites other than work on general active and passive motion. Recommended supplementing with ice and soft tissue massage. Discussed with patient that they should see results in 5-7 days, if no improvement in 5-6 weeks I have asked them to call the office to review other options. Patient should call office immediately if they notice redness, warmth, fevers, chills, or residual numbness or tingling for greater than 6 hours after injection.   4. Follow up: 3 months.       Jennifer Samayoa was in agreement with plan and had all questions answered.     Orders:  Orders Placed This Encounter   Procedures   • Large Joint Arthrocentesis: R knee   • XR Knee 3 View Right       Medications:  No orders of the defined types were placed in this encounter.      Followup:  Return in about 3 months (around 12/8/2021).    Diagnoses and all orders for this visit:    1. Primary osteoarthritis of right knee (Primary)  -     XR Knee 3 View Right    2. Pain due to partial left knee replacement, initial encounter (CMS/[facility])    Other orders  -     Large Joint Arthrocentesis: R knee          Dictated utilizing Dragon dictation     Transcribed from ambient dictation for Arias KENNEY  MD Jose by Arthur Dyson.  09/08/21   10:25 EDT    I have personally performed the services described in this document as transcribed by the above individual, and it is both accurate and complete.  Arias Garcia MD  9/22/2021  22:11 EDT

## 2021-09-22 RX ORDER — LIDOCAINE HYDROCHLORIDE 10 MG/ML
8 INJECTION, SOLUTION EPIDURAL; INFILTRATION; INTRACAUDAL; PERINEURAL
Status: COMPLETED | OUTPATIENT
Start: 2021-09-08 | End: 2021-09-08

## 2021-09-22 RX ORDER — TRIAMCINOLONE ACETONIDE 40 MG/ML
80 INJECTION, SUSPENSION INTRA-ARTICULAR; INTRAMUSCULAR
Status: COMPLETED | OUTPATIENT
Start: 2021-09-08 | End: 2021-09-08

## 2021-12-06 RX ORDER — ALBUTEROL SULFATE 90 UG/1
2 AEROSOL, METERED RESPIRATORY (INHALATION) EVERY 4 HOURS PRN
Qty: 18 G | Refills: 1 | Status: SHIPPED | OUTPATIENT
Start: 2021-12-06 | End: 2023-03-13 | Stop reason: SDUPTHER

## 2021-12-06 NOTE — TELEPHONE ENCOUNTER
Rx Refill Note  Requested Prescriptions     Pending Prescriptions Disp Refills   • albuterol sulfate  (90 Base) MCG/ACT inhaler 18 g 1     Sig: Inhale 2 puffs Every 4 (Four) Hours As Needed for Wheezing.      Last office visit with prescribing clinician: 10/19/2020      Next office visit with prescribing clinician: Visit date not found            Alicia Russell MA  12/06/21, 08:39 EST

## 2022-11-28 ENCOUNTER — OFFICE VISIT (OUTPATIENT)
Dept: SPORTS MEDICINE | Facility: CLINIC | Age: 57
End: 2022-11-28

## 2022-11-28 VITALS
OXYGEN SATURATION: 98 % | DIASTOLIC BLOOD PRESSURE: 74 MMHG | SYSTOLIC BLOOD PRESSURE: 126 MMHG | TEMPERATURE: 97.8 F | WEIGHT: 185 LBS | HEART RATE: 66 BPM | BODY MASS INDEX: 30.82 KG/M2 | HEIGHT: 65 IN

## 2022-11-28 DIAGNOSIS — Z96.60 S/P JOINT IMPLANT: ICD-10-CM

## 2022-11-28 DIAGNOSIS — M25.561 CHRONIC PAIN OF BOTH KNEES: Primary | Chronic | ICD-10-CM

## 2022-11-28 DIAGNOSIS — M25.562 CHRONIC PAIN OF BOTH KNEES: Primary | Chronic | ICD-10-CM

## 2022-11-28 DIAGNOSIS — G89.29 CHRONIC PAIN OF BOTH KNEES: Primary | Chronic | ICD-10-CM

## 2022-11-28 DIAGNOSIS — M17.11 PRIMARY OSTEOARTHRITIS OF RIGHT KNEE: Chronic | ICD-10-CM

## 2022-11-28 PROCEDURE — 99204 OFFICE O/P NEW MOD 45 MIN: CPT | Performed by: FAMILY MEDICINE

## 2022-11-28 PROCEDURE — 20610 DRAIN/INJ JOINT/BURSA W/O US: CPT | Performed by: FAMILY MEDICINE

## 2022-11-28 RX ORDER — TRIAMCINOLONE ACETONIDE 40 MG/ML
40 INJECTION, SUSPENSION INTRA-ARTICULAR; INTRAMUSCULAR ONCE
Status: COMPLETED | OUTPATIENT
Start: 2022-11-28 | End: 2022-11-28

## 2022-11-28 RX ADMIN — TRIAMCINOLONE ACETONIDE 40 MG: 40 INJECTION, SUSPENSION INTRA-ARTICULAR; INTRAMUSCULAR at 10:11

## 2022-11-28 NOTE — PROGRESS NOTES
"Chief Complaint  Knee Pain (RT KNEE-starting to feel like its bone on bone )    Subjective        Jennifer Samayoa presents to [facility]  History of Present Illness  Patient has 2 issues today:  1.  Patient had a partial medial knee replacement about 7 years ago, patient feels like the appliance may be getting \"loose\".  She is having pain and stiffness over the medial left knee especially after prolonged standing and walking.  She has not had any swelling or locking.  No fever or chills.  \"I need referral to a new orthopedist, I am only off on Mondays\".  2.  For the past several months although worse over the past 2 months patient has been having right medial knee pain.  \"It feels like my left knee was before I had a replacement, bone-on-bone\".  She has also noticed some creaking and popping with flexion extension of the knee and stiffness especially after a long workday.  She has not noted any swelling.  States she has had sensation of almost locking but not giving way.    Objective   Vital Signs:  /74 (BP Location: Left arm, Patient Position: Sitting, Cuff Size: Adult)   Pulse 66   Temp 97.8 °F (36.6 °C) (Temporal)   Ht 165.1 cm (65\")   Wt 83.9 kg (185 lb)   SpO2 98%   BMI 30.79 kg/m²   Estimated body mass index is 30.79 kg/m² as calculated from the following:    Height as of this encounter: 165.1 cm (65\").    Weight as of this encounter: 83.9 kg (185 lb).          Physical Exam  Vitals reviewed.   Constitutional:       Appearance: She is well-developed.   HENT:      Head: Normocephalic and atraumatic.   Eyes:      Conjunctiva/sclera: Conjunctivae normal.      Pupils: Pupils are equal, round, and reactive to light.   Cardiovascular:      Comments: No peripheral edema  Pulmonary:      Effort: Pulmonary effort is normal.   Musculoskeletal:      Comments: Right knee with patellofemoral crepitus on flexion extension.  Patient lacks just a few degrees of full extension.  " "Patient has some mild tenderness along the medial joint line.  Negative Lachman.  No pain with varus or valgus stress.  Equivocal medial Suze.  Motor 5 out of 5.   Skin:     General: Skin is warm and dry.   Neurological:      Mental Status: She is alert and oriented to person, place, and time.   Psychiatric:         Behavior: Behavior normal.        Result Review :               Bilateral Knee X-Ray  Indication: Pain    Views: AP, Lateral, and [place]    Findings:  The right knee does show significant medial compartment arthritis which is very close to bone-on-bone, also has patellofemoral compartment and lateral compartment arthritis.      The left knee shows the previous unicompartment replacement, on the AP view the femoral component does not appear to be setting on the tibial component in the correct position, there is also some thinning of the joint space here.      No prior studies were available for comparison.      I discussed with the patient that I will place referral to orthopedic surgeon in regards to the left knee appliance but also the right knee appears to be very close to bone-on-bone.  I have offered corticosteroid injection in the right knee today to help with discomfort until she can see the orthopedist.  She would like to proceed.    Knee Injection Procedure Note    Right knee injection was discussed with the patient in detail, including indication, risks, benefits, and alternatives. Verbal consent was given for the procedure. Injection was performed by MD.  Injection site was identified by physical examination and cleaned with Betadine and alcohol swabs. Prior to needle insertion, ethyl chloride spray was used for surface anesthesia. Sterile technique was used.  A 25-gauge, 1.5\" needle was directed to the joint from a(n) lateral and anterior approach. Injectate was passed into the joint space without difficulty. The needle was removed and a simple bandage was applied. The procedure was " tolerated well without difficulty.    Injection mixture:  1% lidocaine without epinephrine: 3 mL    40 mg/mL triamcinolone acetonide: 1 mL    Assessment and Plan   Diagnoses and all orders for this visit:    1. Chronic pain of both knees (Primary)  -     XR Knee 3 View Bilateral  -     Ambulatory Referral to Orthopedic Surgery    2. Primary osteoarthritis of right knee  -     Ambulatory Referral to Orthopedic Surgery  -     triamcinolone acetonide (KENALOG-40) injection 40 mg    3. S/P joint implant  -     Ambulatory Referral to Orthopedic Surgery      Postinjection instructions given regarding ice and activity.       Follow Up   No follow-ups on file.  Patient was given instructions and counseling regarding her condition or for health maintenance advice. Please see specific information pulled into the AVS if appropriate.

## 2022-12-01 ENCOUNTER — PATIENT ROUNDING (BHMG ONLY) (OUTPATIENT)
Dept: SPORTS MEDICINE | Facility: CLINIC | Age: 57
End: 2022-12-01

## 2022-12-01 NOTE — PROGRESS NOTES
December 1, 2022    A Welcome Card has been sent to the patient for PATIENT [place] [facility]

## 2023-01-19 ENCOUNTER — OFFICE VISIT (OUTPATIENT)
Dept: ORTHOPEDIC SURGERY | Facility: CLINIC | Age: 58
End: 2023-01-19
Payer: COMMERCIAL

## 2023-01-19 VITALS — TEMPERATURE: 98.6 F | HEIGHT: 65 IN | WEIGHT: 185 LBS | BODY MASS INDEX: 30.82 KG/M2

## 2023-01-19 DIAGNOSIS — T84.84XA PAIN DUE TO TOTAL LEFT KNEE REPLACEMENT, INITIAL ENCOUNTER: ICD-10-CM

## 2023-01-19 DIAGNOSIS — Z96.652 PAIN DUE TO TOTAL LEFT KNEE REPLACEMENT, INITIAL ENCOUNTER: ICD-10-CM

## 2023-01-19 DIAGNOSIS — R52 PAIN: ICD-10-CM

## 2023-01-19 DIAGNOSIS — S89.92XA INJURY OF LEFT KNEE, INITIAL ENCOUNTER: Primary | ICD-10-CM

## 2023-01-19 PROCEDURE — 73562 X-RAY EXAM OF KNEE 3: CPT | Performed by: NURSE PRACTITIONER

## 2023-01-19 PROCEDURE — 99214 OFFICE O/P EST MOD 30 MIN: CPT | Performed by: NURSE PRACTITIONER

## 2023-01-19 NOTE — PROGRESS NOTES
"Patient: Jennifer Samayoa  YOB: 1965 57 y.o. female  Medical Record Number: [id number]    Chief Complaints:   Chief Complaint   Patient presents with   • Left Knee - Pain, Initial Evaluation       History of Present Illness:Jennifer Samayoa is a 57 y.o. female who presents as a new patient both myself as well as to the practice with complaints of severe left knee pain.  The patient had a left partial knee replacement approximately 6 years ago, she does not remember the surgeon's name, but she reports\" he is now dead\".  Patient reports she has had some pain off and on through the years but apparently she got stuck in a freezer at work 4 days ago hit the freezer door with her left knee had acute onset of severe medial knee pain which has persisted since.  She denies any infection..  Denies any fever or chills.    Allergies: No Known Allergies    Medications:   Current Outpatient Medications   Medication Sig Dispense Refill   • albuterol sulfate  (90 Base) MCG/ACT inhaler Inhale 2 puffs Every 4 (Four) Hours As Needed for Wheezing. 18 g 1   • fluticasone (FLONASE) 50 MCG/ACT nasal spray 1 spray into the nostril(s) as directed by provider Daily for 30 days. 9.9 mL 0   • glucosamine-chondroitin 500-400 MG capsule capsule Take  by mouth 3 (Three) Times a Day With Meals.       No current facility-administered medications for this visit.         The following portions of the patient's history were reviewed and updated as appropriate: allergies, current medications, past family history, past medical history, past social history, past surgical history and problem list.    Review of Systems:   A 14 point review of systems was performed. All systems negative except pertinent positives/negative listed in HPI above    Physical Exam:   Vitals:    01/19/23 0906   Temp: 98.6 °F (37 °C)   TempSrc: Temporal   Weight: 83.9 kg (185 lb)   Height: 165.1 cm (65\")   PainSc:   9   PainLoc: Knee       General: A and O x 3, " ASA, NAD    SCLERA:    Normal    Skin clear no unusual lesions noted  Left knee patient has trace amount of effusion noted with 110 degrees flexion neutral in extension with no unusual instability she does have severe medial knee pain with range of motion calf is soft and nontender         Radiology:  Xrays 3views (ap,lateral, sunrise) left knee were ordered and reviewed today secondary to severe pain show bone-on-bone end-stage osteoarthritis of the left knee with lucencies about the unicompartmental replacement.  We do not have any previous films to compare to    Assessment/Plan: Severe left knee pain following injury    Patient and I discussed options, Dr. Sanz did review patient's x-rays and case.  We are recommending that she start using a walker limited weightbearing only, note off of work until she is seen and Dr. Sanz would like for her to see Dr. Wilfredo Schaffer for probable knee revision.  While we do not appreciate any unusual fractures at this time it is very difficult to fully determine given the amount of arthritis that she has.  Patient verbalized understanding and we will asked that she see Dr. Schaffer as soon as possible.      Vandana Dee, APRN  1/19/2023

## 2023-01-19 NOTE — LETTER
January 19, 2023     Patient: Jennifer Samayoa   YOB: 1965   Date of Visit: 1/19/2023       To Whom It May Concern:    It is my medical opinion that Jennifer Samayoa should remain out of work until seen by Dr. Wilfredo Schaffer Orthopedist .           Sincerely,        Vandana Dee, APRN

## 2023-01-27 ENCOUNTER — TELEPHONE (OUTPATIENT)
Dept: ORTHOPEDIC SURGERY | Facility: CLINIC | Age: 58
End: 2023-01-27
Payer: COMMERCIAL

## 2023-01-27 NOTE — TELEPHONE ENCOUNTER
----- Message from Jennifer Weathers MA sent at 1/25/2023  2:03 PM EST -----  Regarding: RE: Referral appoint  Contact: [phone]  Please put x-rays on a disc pt  to  tomorrow    ----- Message -----  From: Jennifer Samayoa  Sent: 1/25/2023   2:01 PM EST  To: [facility]  Subject: Referral appoint                                 My  will  the disk tomorrow

## 2023-02-01 ENCOUNTER — TRANSCRIBE ORDERS (OUTPATIENT)
Dept: ADMINISTRATIVE | Facility: HOSPITAL | Age: 58
End: 2023-02-01
Payer: COMMERCIAL

## 2023-02-01 ENCOUNTER — LAB (OUTPATIENT)
Dept: LAB | Facility: HOSPITAL | Age: 58
End: 2023-02-01
Payer: COMMERCIAL

## 2023-02-01 DIAGNOSIS — Z96.659 PROSTHETIC KNEE IMPLANT FAILURE, INITIAL ENCOUNTER: Primary | ICD-10-CM

## 2023-02-01 DIAGNOSIS — T84.018A PROSTHETIC KNEE IMPLANT FAILURE, INITIAL ENCOUNTER: ICD-10-CM

## 2023-02-01 DIAGNOSIS — T84.018A PROSTHETIC KNEE IMPLANT FAILURE, INITIAL ENCOUNTER: Primary | ICD-10-CM

## 2023-02-01 DIAGNOSIS — Z96.659 PROSTHETIC KNEE IMPLANT FAILURE, INITIAL ENCOUNTER: ICD-10-CM

## 2023-02-01 LAB
CRP SERPL-MCNC: 0.67 MG/DL (ref 0–0.5)
ERYTHROCYTE [SEDIMENTATION RATE] IN BLOOD: 29 MM/HR (ref 0–30)

## 2023-02-01 PROCEDURE — 36415 COLL VENOUS BLD VENIPUNCTURE: CPT

## 2023-02-01 PROCEDURE — 86140 C-REACTIVE PROTEIN: CPT

## 2023-02-01 PROCEDURE — 85652 RBC SED RATE AUTOMATED: CPT

## 2023-03-13 ENCOUNTER — OFFICE VISIT (OUTPATIENT)
Dept: FAMILY MEDICINE CLINIC | Facility: CLINIC | Age: 58
End: 2023-03-13
Payer: COMMERCIAL

## 2023-03-13 VITALS
BODY MASS INDEX: 32.27 KG/M2 | HEIGHT: 65 IN | SYSTOLIC BLOOD PRESSURE: 169 MMHG | OXYGEN SATURATION: 98 % | HEART RATE: 87 BPM | TEMPERATURE: 97.1 F | WEIGHT: 193.7 LBS | DIASTOLIC BLOOD PRESSURE: 105 MMHG

## 2023-03-13 DIAGNOSIS — Z00.00 HEALTH CARE MAINTENANCE: Primary | ICD-10-CM

## 2023-03-13 DIAGNOSIS — Z12.31 ENCOUNTER FOR SCREENING MAMMOGRAM FOR MALIGNANT NEOPLASM OF BREAST: ICD-10-CM

## 2023-03-13 DIAGNOSIS — R03.0 ELEVATED BLOOD PRESSURE READING: ICD-10-CM

## 2023-03-13 DIAGNOSIS — Z11.59 NEED FOR HEPATITIS C SCREENING TEST: ICD-10-CM

## 2023-03-13 PROCEDURE — 99396 PREV VISIT EST AGE 40-64: CPT | Performed by: FAMILY MEDICINE

## 2023-03-13 RX ORDER — ALBUTEROL SULFATE 90 UG/1
2 AEROSOL, METERED RESPIRATORY (INHALATION) EVERY 4 HOURS PRN
Qty: 18 G | Refills: 1 | Status: SHIPPED | OUTPATIENT
Start: 2023-03-13

## 2023-03-13 NOTE — TELEPHONE ENCOUNTER
Caller: Jennifer Samayoa    Relationship: Self    Best call back number: [phone]    Requested Prescriptions:   Requested Prescriptions     Pending Prescriptions Disp Refills   • albuterol sulfate  (90 Base) MCG/ACT inhaler 18 g 1     Sig: Inhale 2 puffs Every 4 (Four) Hours As Needed for Wheezing.        Pharmacy where request should be sent: [id number] [address] AT [facility] [address]  [place] - [phone] [facility] [phone] FX     Additional details provided by patient: PATIENT IS OUT OF MEDICATION. PATIENT STATES THIS MEDICATION WAS SUPPOSED TO BE CALLED IN BUT THE PHARMACY DOES NOT HAVE IT     Does the patient have less than a 3 day supply:  [x] Yes  [] No    Would you like a call back once the refill request has been completed: [] Yes [x] No    If the office needs to give you a call back, can they leave a voicemail: [] Yes [x] No    Bridger Oseguera Rep   03/13/23 15:09 EDT

## 2023-03-13 NOTE — TELEPHONE ENCOUNTER
Rx Refill Note  Requested Prescriptions     Pending Prescriptions Disp Refills   • albuterol sulfate  (90 Base) MCG/ACT inhaler 18 g 1     Sig: Inhale 2 puffs Every 4 (Four) Hours As Needed for Wheezing.      Last office visit with prescribing clinician: 3/13/2023   Last telemedicine visit with prescribing clinician: 4/17/2023   Next office visit with prescribing clinician: 4/17/2023                         Would you like a call back once the refill request has been completed: [] Yes [] No    If the office needs to give you a call back, can they leave a voicemail: [] Yes [] No    Kamryn Beckman  03/13/23, 16:48 EDT

## 2023-03-13 NOTE — PATIENT INSTRUCTIONS
I am very concerned about your blood pressure today.  It is very elevated.  May just be a one-time thing.  I want you to start checking your blood pressure at home on a regular basis.  And then I want to see you in 6 weeks to go over the numbers.  Please check daily for the next 2 or 3 weeks at least.  Check different times of the day.  Make sure you sit for 5 minutes first.  Do not check directly after exercise.  Purchase a home blood pressure monitor.  They are about $35 at most major retailers.  Make sure it is an arm monitor and not a wrist monitor.  And then if you not sure what to do please let us know.  You are also welcome to send me a week or 2 worth of data via a [employer] message if you have questions about the numbers going forward.  A very good number is less than 120/80.

## 2023-03-13 NOTE — PROGRESS NOTES
"Chief Complaint  Asthma, Annual Exam, and Rash (Rash on calves )    Subjective        Jennifer Samayoa presents to [facility]  History of Present Illness     Annual healthcare maintenance visit.  Not seen her in almost 2 years.  Her blood pressure is very elevated today.  Last time she was here in our system that was normal last year.  No headache no chest pain no exertional symptoms.  Non-smoker.  No heavy alcohol use.  She has a Worker's Comp. knee injury, followed by orthopedics.  She is able to walk, does has trouble with deep bending and lifting.  Rash on her legs for a number of months.  Getting a little worse.  She gets some ankle swelling when she stands for long periods of time    Objective   Vital Signs:  BP (!) 169/105   Pulse 87   Temp 97.1 °F (36.2 °C) (Temporal)   Ht 165.1 cm (65\")   Wt 87.9 kg (193 lb 11.2 oz)   SpO2 98%   BMI 32.23 kg/m²   Estimated body mass index is 32.23 kg/m² as calculated from the following:    Height as of this encounter: 165.1 cm (65\").    Weight as of this encounter: 87.9 kg (193 lb 11.2 oz).             Physical Exam  Constitutional:       Appearance: Normal appearance.   HENT:      Head: Atraumatic.      Mouth/Throat:      Pharynx: Oropharynx is clear. No oropharyngeal exudate or posterior oropharyngeal erythema.   Eyes:      Conjunctiva/sclera: Conjunctivae normal.   Cardiovascular:      Rate and Rhythm: Normal rate and regular rhythm.      Pulses: Normal pulses.      Heart sounds: Normal heart sounds.   Pulmonary:      Effort: Pulmonary effort is normal.      Breath sounds: Normal breath sounds.   Abdominal:      General: Abdomen is flat. There is no distension.      Palpations: Abdomen is soft. There is no mass.      Tenderness: There is no abdominal tenderness.      Hernia: No hernia is present.   Musculoskeletal:         General: Normal range of motion.      Cervical back: Normal range of motion and neck supple. No muscular " tenderness.      Right lower leg: No edema.      Left lower leg: No edema.   Lymphadenopathy:      Cervical: No cervical adenopathy.   Skin:     General: Skin is warm and dry.      Findings: Rash ( Thickened skin, circumferential bilateral lower extremities inferior to the calves.  Left greater than right.  Some rough texture.  Minimal erythema.  Appears to be chronic venous stasis dermatitis with postinflammatory hyperpigmentation.) present.   Neurological:      General: No focal deficit present.      Mental Status: She is alert and oriented to person, place, and time.   Psychiatric:         Mood and Affect: Mood normal.        Result Review :                   Assessment and Plan   Diagnoses and all orders for this visit:    1. Health care maintenance (Primary)  -     CBC & Differential  -     Comprehensive Metabolic Panel  -     Lipid Panel  -     Urinalysis With Microscopic If Indicated (No Culture) - Urine, Clean Catch  -     Hepatitis C Antibody    2. Encounter for screening mammogram for malignant neoplasm of breast  -     Mammo Screening Bilateral With CAD    3. Elevated blood pressure reading  -     CBC & Differential  -     Comprehensive Metabolic Panel  -     Lipid Panel  -     Urinalysis With Microscopic If Indicated (No Culture) - Urine, Clean Catch    4. Need for hepatitis C screening test  -     Hepatitis C Antibody      Annual healthcare maintenance visit.    Colon cancer screening up-to-date.    Breast cancer screening.  Mammogram ordered.    Elevated blood pressure.  Fairly high today.  Asymptomatic.  Not sure if single or chronic.  She is going to start checking her blood pressure on a regular basis and see me in 6 weeks.  See patient discharge instructions.    Chronic venous stasis dermatitis of lower extremities.  No treatment needed at this time.  Asymptomatic.    Screening lab work ordered today.    Follow-up in 6 weeks for blood pressure check         Follow Up   No follow-ups on  file.  Patient was given instructions and counseling regarding her condition or for health maintenance advice. Please see specific information pulled into the AVS if appropriate.       Answers for HPI/ROS submitted by the patient on 3/9/2023  Please describe your symptoms.: New prescription and questions about discoloration on both legs  Have you had these symptoms before?: No  How long have you been having these symptoms?: Greater than 2 weeks  Please list any medications you are currently taking for this condition.: Albuterol  Please describe any probable cause for these symptoms. : N/A  What is the primary reason for your visit?: Other

## 2023-03-14 LAB
ALBUMIN SERPL-MCNC: 4.8 G/DL (ref 3.5–5.2)
ALBUMIN/GLOB SERPL: 1.8 G/DL
ALP SERPL-CCNC: 148 U/L (ref 39–117)
ALT SERPL-CCNC: 10 U/L (ref 1–33)
APPEARANCE UR: CLEAR
AST SERPL-CCNC: 11 U/L (ref 1–32)
BASOPHILS # BLD AUTO: 0.05 10*3/MM3 (ref 0–0.2)
BASOPHILS NFR BLD AUTO: 0.8 % (ref 0–1.5)
BILIRUB SERPL-MCNC: 0.3 MG/DL (ref 0–1.2)
BILIRUB UR QL STRIP: NEGATIVE
BUN SERPL-MCNC: 16 MG/DL (ref 6–20)
BUN/CREAT SERPL: 20.8 (ref 7–25)
CALCIUM SERPL-MCNC: 10 MG/DL (ref 8.6–10.5)
CHLORIDE SERPL-SCNC: 106 MMOL/L (ref 98–107)
CHOLEST SERPL-MCNC: 195 MG/DL (ref 0–200)
CO2 SERPL-SCNC: 23.5 MMOL/L (ref 22–29)
COLOR UR: YELLOW
CREAT SERPL-MCNC: 0.77 MG/DL (ref 0.57–1)
EGFRCR SERPLBLD CKD-EPI 2021: 89.5 ML/MIN/1.73
EOSINOPHIL # BLD AUTO: 0.38 10*3/MM3 (ref 0–0.4)
EOSINOPHIL NFR BLD AUTO: 6.2 % (ref 0.3–6.2)
ERYTHROCYTE [DISTWIDTH] IN BLOOD BY AUTOMATED COUNT: 14.3 % (ref 12.3–15.4)
GLOBULIN SER CALC-MCNC: 2.6 GM/DL
GLUCOSE SERPL-MCNC: 88 MG/DL (ref 65–99)
GLUCOSE UR QL STRIP: NEGATIVE
HCT VFR BLD AUTO: 39 % (ref 34–46.6)
HCV IGG SERPL QL IA: NON REACTIVE
HDLC SERPL-MCNC: 42 MG/DL (ref 40–60)
HGB BLD-MCNC: 12.6 G/DL (ref 12–15.9)
HGB UR QL STRIP: NEGATIVE
IMM GRANULOCYTES # BLD AUTO: 0.02 10*3/MM3 (ref 0–0.05)
IMM GRANULOCYTES NFR BLD AUTO: 0.3 % (ref 0–0.5)
KETONES UR QL STRIP: NEGATIVE
LDLC SERPL CALC-MCNC: 106 MG/DL (ref 0–100)
LEUKOCYTE ESTERASE UR QL STRIP: NEGATIVE
LYMPHOCYTES # BLD AUTO: 1.81 10*3/MM3 (ref 0.7–3.1)
LYMPHOCYTES NFR BLD AUTO: 29.3 % (ref 19.6–45.3)
MCH RBC QN AUTO: 26.6 PG (ref 26.6–33)
MCHC RBC AUTO-ENTMCNC: 32.3 G/DL (ref 31.5–35.7)
MCV RBC AUTO: 82.3 FL (ref 79–97)
MONOCYTES # BLD AUTO: 0.47 10*3/MM3 (ref 0.1–0.9)
MONOCYTES NFR BLD AUTO: 7.6 % (ref 5–12)
NEUTROPHILS # BLD AUTO: 3.44 10*3/MM3 (ref 1.7–7)
NEUTROPHILS NFR BLD AUTO: 55.8 % (ref 42.7–76)
NITRITE UR QL STRIP: NEGATIVE
NRBC BLD AUTO-RTO: 0 /100 WBC (ref 0–0.2)
PH UR STRIP: 7.5 [PH] (ref 5–8)
PLATELET # BLD AUTO: 256 10*3/MM3 (ref 140–450)
POTASSIUM SERPL-SCNC: 4.3 MMOL/L (ref 3.5–5.2)
PROT SERPL-MCNC: 7.4 G/DL (ref 6–8.5)
PROT UR QL STRIP: NEGATIVE
RBC # BLD AUTO: 4.74 10*6/MM3 (ref 3.77–5.28)
SODIUM SERPL-SCNC: 144 MMOL/L (ref 136–145)
SP GR UR STRIP: 1.01 (ref 1–1.03)
TRIGL SERPL-MCNC: 272 MG/DL (ref 0–150)
UROBILINOGEN UR STRIP-MCNC: NORMAL MG/DL
VLDLC SERPL CALC-MCNC: 47 MG/DL (ref 5–40)
WBC # BLD AUTO: 6.17 10*3/MM3 (ref 3.4–10.8)

## 2023-03-27 ENCOUNTER — TELEPHONE (OUTPATIENT)
Dept: FAMILY MEDICINE CLINIC | Facility: CLINIC | Age: 58
End: 2023-03-27

## 2023-03-27 ENCOUNTER — HOSPITAL ENCOUNTER (OUTPATIENT)
Dept: MAMMOGRAPHY | Facility: HOSPITAL | Age: 58
Discharge: HOME | End: 2023-03-27
Admitting: FAMILY MEDICINE
Payer: COMMERCIAL

## 2023-03-27 PROCEDURE — 77067 SCR MAMMO BI INCL CAD: CPT

## 2023-03-27 PROCEDURE — 77063 BREAST TOMOSYNTHESIS BI: CPT

## 2023-03-31 ENCOUNTER — HOSPITAL ENCOUNTER (OUTPATIENT)
Dept: GENERAL RADIOLOGY | Facility: HOSPITAL | Age: 58
Discharge: HOME | End: 2023-03-31
Payer: COMMERCIAL

## 2023-03-31 ENCOUNTER — PRE-ADMISSION TESTING (OUTPATIENT)
Dept: PREADMISSION TESTING | Facility: HOSPITAL | Age: 58
End: 2023-03-31
Payer: COMMERCIAL

## 2023-03-31 VITALS
TEMPERATURE: 98.2 F | DIASTOLIC BLOOD PRESSURE: 96 MMHG | SYSTOLIC BLOOD PRESSURE: 164 MMHG | BODY MASS INDEX: 33.49 KG/M2 | WEIGHT: 189 LBS | OXYGEN SATURATION: 97 % | HEIGHT: 63 IN | HEART RATE: 83 BPM | RESPIRATION RATE: 16 BRPM

## 2023-03-31 LAB
ALBUMIN SERPL-MCNC: 4.3 G/DL (ref 3.5–5.2)
ALBUMIN/GLOB SERPL: 1.2 G/DL
ALP SERPL-CCNC: 136 U/L (ref 39–117)
ALT SERPL W P-5'-P-CCNC: 14 U/L (ref 1–33)
ANION GAP SERPL CALCULATED.3IONS-SCNC: 10.9 MMOL/L (ref 5–15)
AST SERPL-CCNC: 13 U/L (ref 1–32)
BILIRUB SERPL-MCNC: 0.7 MG/DL (ref 0–1.2)
BUN SERPL-MCNC: 12 MG/DL (ref 6–20)
BUN/CREAT SERPL: 14.8 (ref 7–25)
CALCIUM SPEC-SCNC: 10.1 MG/DL (ref 8.6–10.5)
CHLORIDE SERPL-SCNC: 107 MMOL/L (ref 98–107)
CO2 SERPL-SCNC: 24.1 MMOL/L (ref 22–29)
CREAT SERPL-MCNC: 0.81 MG/DL (ref 0.57–1)
DEPRECATED RDW RBC AUTO: 45 FL (ref 37–54)
EGFRCR SERPLBLD CKD-EPI 2021: 84.3 ML/MIN/1.73
ERYTHROCYTE [DISTWIDTH] IN BLOOD BY AUTOMATED COUNT: 14.7 % (ref 12.3–15.4)
GLOBULIN UR ELPH-MCNC: 3.5 GM/DL
GLUCOSE SERPL-MCNC: 100 MG/DL (ref 65–99)
HBA1C MFR BLD: 5.1 % (ref 4.8–5.6)
HCT VFR BLD AUTO: 39.3 % (ref 34–46.6)
HGB BLD-MCNC: 12.8 G/DL (ref 12–15.9)
INR PPP: 1.02 (ref 0.9–1.1)
MCH RBC QN AUTO: 27.3 PG (ref 26.6–33)
MCHC RBC AUTO-ENTMCNC: 32.6 G/DL (ref 31.5–35.7)
MCV RBC AUTO: 83.8 FL (ref 79–97)
PLATELET # BLD AUTO: 226 10*3/MM3 (ref 140–450)
PMV BLD AUTO: 10.2 FL (ref 6–12)
POTASSIUM SERPL-SCNC: 4.3 MMOL/L (ref 3.5–5.2)
PROT SERPL-MCNC: 7.8 G/DL (ref 6–8.5)
PROTHROMBIN TIME: 13.5 SECONDS (ref 11.7–14.2)
QT INTERVAL: 402 MS
RBC # BLD AUTO: 4.69 10*6/MM3 (ref 3.77–5.28)
SODIUM SERPL-SCNC: 142 MMOL/L (ref 136–145)
WBC NRBC COR # BLD: 6.43 10*3/MM3 (ref 3.4–10.8)

## 2023-03-31 PROCEDURE — 85610 PROTHROMBIN TIME: CPT

## 2023-03-31 PROCEDURE — 93005 ELECTROCARDIOGRAM TRACING: CPT

## 2023-03-31 PROCEDURE — 36415 COLL VENOUS BLD VENIPUNCTURE: CPT

## 2023-03-31 PROCEDURE — 83036 HEMOGLOBIN GLYCOSYLATED A1C: CPT

## 2023-03-31 PROCEDURE — 93010 ELECTROCARDIOGRAM REPORT: CPT | Performed by: STUDENT IN AN ORGANIZED HEALTH CARE EDUCATION/TRAINING PROGRAM

## 2023-03-31 PROCEDURE — 85027 COMPLETE CBC AUTOMATED: CPT

## 2023-03-31 PROCEDURE — 73560 X-RAY EXAM OF KNEE 1 OR 2: CPT

## 2023-03-31 PROCEDURE — 80053 COMPREHEN METABOLIC PANEL: CPT

## 2023-03-31 RX ORDER — CHLORHEXIDINE GLUCONATE 500 MG/1
CLOTH TOPICAL
COMMUNITY
End: 2023-04-21 | Stop reason: HOSPADM

## 2023-03-31 NOTE — DISCHARGE INSTRUCTIONS
Take the following medications the morning of surgery:    NONE    ARRIVE AT 0900.      If you are on prescription narcotic pain medication to control your pain you may also take that medication the morning of surgery.    General Instructions:  Do not eat solid food after midnight the night before surgery.  You may drink clear liquids day of surgery but must stop at least one hour before your hospital arrival time.  It is beneficial for you to have a clear drink that contains carbohydrates the day of surgery.  We suggest a 12 to 20 ounce bottle of Gatorade or Powerade for non-diabetic patients or a 12 to 20 ounce bottle of G2 or Powerade Zero for diabetic patients. (Pediatric patients, are not advised to drink a 12 to 20 ounce carbohydrate drink)    Clear liquids are liquids you can see through.  Nothing red in color.     Plain water                               Sports drinks  Sodas                                   Gelatin (Jell-O)  Fruit juices without pulp such as white grape juice and apple juice  Popsicles that contain no fruit or yogurt  Tea or coffee (no cream or milk added)  Gatorade / Powerade  G2 / Powerade Zero    Infants may have breast milk up to four hours before surgery.  Infants drinking formula may drink formula up to six hours before surgery.   Patients who avoid smoking, chewing tobacco and alcohol for 4 weeks prior to surgery have a reduced risk of post-operative complications.  Quit smoking as many days before surgery as you can.  Do not smoke, use chewing tobacco or drink alcohol the day of surgery.   If applicable bring your C-PAP/ BI-PAP machine.  Bring any papers given to you in the doctor’s office.  Wear clean comfortable clothes.  Do not wear contact lenses, false eyelashes or make-up.  Bring a case for your glasses.   Bring crutches or walker if applicable.  Remove all piercings.  Leave jewelry and any other valuables at home.  Hair extensions with metal clips must be removed prior to  surgery.  The Pre-Admission Testing nurse will instruct you to bring medications if unable to obtain an accurate list in Pre-Admission Testing.        If you were given a blood bank ID arm band remember to bring it with you the day of surgery.    Preventing a Surgical Site Infection:  For 2 to 3 days before surgery, avoid shaving with a razor because the razor can irritate skin and make it easier to develop an infection.    Any areas of open skin can increase the risk of a post-operative wound infection by allowing bacteria to enter and travel throughout the body.  Notify your surgeon if you have any skin wounds / rashes even if it is not near the expected surgical site.  The area will need assessed to determine if surgery should be delayed until it is healed.  The night prior to surgery shower using a fresh bar of anti-bacterial soap (such as Dial) and clean washcloth.  Sleep in a clean bed with clean clothing.  Do not allow pets to sleep with you.  Shower on the morning of surgery using a fresh bar of anti-bacterial soap (such as Dial) and clean washcloth.  Dry with a clean towel and dress in clean clothing.  Ask your surgeon if you will be receiving antibiotics prior to surgery.  Make sure you, your family, and all healthcare providers clean their hands with soap and water or an alcohol based hand  before caring for you or your wound.    Day of surgery:  Your arrival time is approximately two hours before your scheduled surgery time.  Upon arrival, a Pre-op nurse and Anesthesiologist will review your health history, obtain vital signs, and answer questions you may have.  The only belongings needed at this time will be a list of your home medications and if applicable your C-PAP/BI-PAP machine.  A Pre-op nurse will start an IV and you may receive medication in preparation for surgery, including something to help you relax.     Please be aware that surgery does come with discomfort.  We want to make every  effort to control your discomfort so please discuss any uncontrolled symptoms with your nurse.   Your doctor will most likely have prescribed pain medications.      If you are going home after surgery you will receive individualized written care instructions before being discharged.  A responsible adult must drive you to and from the hospital on the day of your surgery and stay with you for 24 hours.  Discharge prescriptions can be filled by the hospital pharmacy during regular pharmacy hours.  If you are having surgery late in the day/evening your prescription may be e-prescribed to your pharmacy.  Please verify your pharmacy hours or chose a 24 hour pharmacy to avoid not having access to your prescription because your pharmacy has closed for the day.    If you are staying overnight following surgery, you will be transported to your hospital room following the recovery period.  [facility] has all private rooms.    If you have any questions please call Pre-Admission Testing at [phone].  Deductibles and co-payments are collected on the day of service. Please be prepared to pay the required co-pay, deductible or deposit on the day of service as defined by your plan.    Call your surgeon immediately if you experience any of the following symptoms:  Sore Throat  Shortness of Breath or difficulty breathing  Cough  Chills  Body soreness or muscle pain  Headache  Fever  New loss of taste or smell  Do not arrive for your surgery ill.  Your procedure will need to be rescheduled to another time.  You will need to call your physician before the day of surgery to avoid any unnecessary exposure to hospital staff as well as other patients.     CHLORHEXIDINE CLOTH INSTRUCTIONS  The morning of surgery follow these instructions using the Chlorhexidine cloths you've been given.  These steps reduce bacteria on the body.  Do not use the cloths near your eyes, ears mouth, genitalia or on open wounds.  Throw the  cloths away after use but do not try to flush them down a toilet.      Open and remove one cloth at a time from the package.    Leave the cloth unfolded and begin the bathing.  Massage the skin with the cloths using gentle pressure to remove bacteria.  Do not scrub harshly.   Follow the steps below with one 2% CHG cloth per area (6 total cloths).  One cloth for neck, shoulders and chest.  One cloth for both arms, hands, fingers and underarms (do underarms last).  One cloth for the abdomen followed by groin.  One cloth for right leg and foot including between the toes.  One cloth for left leg and foot including between the toes.  The last cloth is to be used for the back of the neck, back and buttocks.    Allow the CHG to air dry 3 minutes on the skin which will give it time to work and decrease the chance of irritation.  The skin may feel sticky until it is dry.  Do not rinse with water or any other liquid or you will lose the beneficial effects of the CHG.  If mild skin irritation occurs, do rinse the skin to remove the CHG.  Report this to the nurse at time of admission.  Do not apply lotions, creams, ointments, deodorants or perfumes after using the clothes. Dress in clean clothes before coming to the hospital.

## 2023-03-31 NOTE — PAT
Scheduled for a left total knee replacement with Dr. Schaffer. Her  is here with her today.  She denies any hx of CAD.  She says she exercises at times.  She is able to walk distances and climb several flights of stairs without any symptoms of CP or SOA.  She has watched the joint replacement video and has no questions.  Her  will help her after discharge.    PAT labs and x-ray acceptable.  EKG NSR.

## 2023-04-19 ENCOUNTER — ANESTHESIA (OUTPATIENT)
Dept: PERIOP | Facility: HOSPITAL | Age: 58
End: 2023-04-19
Payer: COMMERCIAL

## 2023-04-19 ENCOUNTER — ANESTHESIA EVENT (OUTPATIENT)
Dept: PERIOP | Facility: HOSPITAL | Age: 58
End: 2023-04-19
Payer: COMMERCIAL

## 2023-04-19 ENCOUNTER — HOSPITAL ENCOUNTER (OUTPATIENT)
Facility: HOSPITAL | Age: 58
Setting detail: OBSERVATION
Discharge: HOME HEALTH | End: 2023-04-21
Attending: ORTHOPAEDIC SURGERY | Admitting: ORTHOPAEDIC SURGERY
Payer: COMMERCIAL

## 2023-04-19 ENCOUNTER — APPOINTMENT (OUTPATIENT)
Dept: GENERAL RADIOLOGY | Facility: HOSPITAL | Age: 58
End: 2023-04-19
Payer: COMMERCIAL

## 2023-04-19 DIAGNOSIS — M17.10 PRIMARY OSTEOARTHRITIS OF KNEE: ICD-10-CM

## 2023-04-19 DIAGNOSIS — T84.019A FAILURE OF ARTHROPLASTY, INITIAL ENCOUNTER: Primary | ICD-10-CM

## 2023-04-19 PROCEDURE — 87176 TISSUE HOMOGENIZATION CULTR: CPT | Performed by: ORTHOPAEDIC SURGERY

## 2023-04-19 PROCEDURE — 25010000002 FENTANYL CITRATE (PF) 50 MCG/ML SOLUTION: Performed by: ANESTHESIOLOGY

## 2023-04-19 PROCEDURE — 25010000002 ONDANSETRON PER 1 MG: Performed by: REGISTERED NURSE

## 2023-04-19 PROCEDURE — G0378 HOSPITAL OBSERVATION PER HR: HCPCS

## 2023-04-19 PROCEDURE — 25010000002 HYDROMORPHONE 1 MG/ML SOLUTION: Performed by: NURSE ANESTHETIST, CERTIFIED REGISTERED

## 2023-04-19 PROCEDURE — 25010000002 ROPIVACAINE PER 1 MG: Performed by: ORTHOPAEDIC SURGERY

## 2023-04-19 PROCEDURE — 25010000002 EPINEPHRINE 1 MG/ML SOLUTION 30 ML VIAL: Performed by: ORTHOPAEDIC SURGERY

## 2023-04-19 PROCEDURE — 87015 SPECIMEN INFECT AGNT CONCNTJ: CPT | Performed by: ORTHOPAEDIC SURGERY

## 2023-04-19 PROCEDURE — 25010000002 DEXAMETHASONE SODIUM PHOSPHATE 20 MG/5ML SOLUTION: Performed by: NURSE ANESTHETIST, CERTIFIED REGISTERED

## 2023-04-19 PROCEDURE — 25010000002 PROPOFOL 10 MG/ML EMULSION: Performed by: NURSE ANESTHETIST, CERTIFIED REGISTERED

## 2023-04-19 PROCEDURE — 25010000002 KETOROLAC TROMETHAMINE PER 15 MG: Performed by: ORTHOPAEDIC SURGERY

## 2023-04-19 PROCEDURE — 25010000002 NEOSTIGMINE 5 MG/10ML SOLUTION: Performed by: NURSE ANESTHETIST, CERTIFIED REGISTERED

## 2023-04-19 PROCEDURE — C1713 ANCHOR/SCREW BN/BN,TIS/BN: HCPCS | Performed by: ORTHOPAEDIC SURGERY

## 2023-04-19 PROCEDURE — 73560 X-RAY EXAM OF KNEE 1 OR 2: CPT

## 2023-04-19 PROCEDURE — 25010000002 DEXAMETHASONE PER 1 MG: Performed by: ANESTHESIOLOGY

## 2023-04-19 PROCEDURE — 25010000002 FENTANYL CITRATE (PF) 100 MCG/2ML SOLUTION: Performed by: NURSE ANESTHETIST, CERTIFIED REGISTERED

## 2023-04-19 PROCEDURE — 25010000002 ONDANSETRON PER 1 MG: Performed by: NURSE ANESTHETIST, CERTIFIED REGISTERED

## 2023-04-19 PROCEDURE — 25010000002 ROPIVACAINE PER 1 MG: Performed by: ANESTHESIOLOGY

## 2023-04-19 PROCEDURE — 87205 SMEAR GRAM STAIN: CPT | Performed by: ORTHOPAEDIC SURGERY

## 2023-04-19 PROCEDURE — 25010000002 CEFAZOLIN IN DEXTROSE 2-4 GM/100ML-% SOLUTION: Performed by: ORTHOPAEDIC SURGERY

## 2023-04-19 PROCEDURE — 87070 CULTURE OTHR SPECIMN AEROBIC: CPT | Performed by: ORTHOPAEDIC SURGERY

## 2023-04-19 PROCEDURE — 25010000002 MIDAZOLAM PER 1 MG: Performed by: ANESTHESIOLOGY

## 2023-04-19 PROCEDURE — 25010000002 CLONIDINE PER 1 MG: Performed by: ORTHOPAEDIC SURGERY

## 2023-04-19 PROCEDURE — 87075 CULTR BACTERIA EXCEPT BLOOD: CPT | Performed by: ORTHOPAEDIC SURGERY

## 2023-04-19 RX ORDER — EPHEDRINE SULFATE 50 MG/ML
5 INJECTION, SOLUTION INTRAVENOUS ONCE AS NEEDED
Status: DISCONTINUED | OUTPATIENT
Start: 2023-04-19 | End: 2023-04-19 | Stop reason: HOSPADM

## 2023-04-19 RX ORDER — CEFAZOLIN SODIUM 2 G/100ML
2 INJECTION, SOLUTION INTRAVENOUS ONCE
Status: COMPLETED | OUTPATIENT
Start: 2023-04-19 | End: 2023-04-19

## 2023-04-19 RX ORDER — TRANEXAMIC ACID 100 MG/ML
INJECTION, SOLUTION INTRAVENOUS AS NEEDED
Status: DISCONTINUED | OUTPATIENT
Start: 2023-04-19 | End: 2023-04-19 | Stop reason: SURG

## 2023-04-19 RX ORDER — DEXAMETHASONE SODIUM PHOSPHATE 4 MG/ML
INJECTION, SOLUTION INTRA-ARTICULAR; INTRALESIONAL; INTRAMUSCULAR; INTRAVENOUS; SOFT TISSUE
Status: COMPLETED | OUTPATIENT
Start: 2023-04-19 | End: 2023-04-19

## 2023-04-19 RX ORDER — HYDROCODONE BITARTRATE AND ACETAMINOPHEN 7.5; 325 MG/1; MG/1
1 TABLET ORAL ONCE AS NEEDED
Status: DISCONTINUED | OUTPATIENT
Start: 2023-04-19 | End: 2023-04-19 | Stop reason: HOSPADM

## 2023-04-19 RX ORDER — ROPIVACAINE HYDROCHLORIDE 5 MG/ML
INJECTION, SOLUTION EPIDURAL; INFILTRATION; PERINEURAL
Status: COMPLETED | OUTPATIENT
Start: 2023-04-19 | End: 2023-04-19

## 2023-04-19 RX ORDER — SODIUM CHLORIDE 9 MG/ML
100 INJECTION, SOLUTION INTRAVENOUS CONTINUOUS
Status: DISCONTINUED | OUTPATIENT
Start: 2023-04-19 | End: 2023-04-21 | Stop reason: HOSPADM

## 2023-04-19 RX ORDER — HYDROMORPHONE HYDROCHLORIDE 1 MG/ML
0.5 INJECTION, SOLUTION INTRAMUSCULAR; INTRAVENOUS; SUBCUTANEOUS
Status: DISCONTINUED | OUTPATIENT
Start: 2023-04-19 | End: 2023-04-19 | Stop reason: HOSPADM

## 2023-04-19 RX ORDER — DROPERIDOL 2.5 MG/ML
0.62 INJECTION, SOLUTION INTRAMUSCULAR; INTRAVENOUS
Status: DISCONTINUED | OUTPATIENT
Start: 2023-04-19 | End: 2023-04-19 | Stop reason: HOSPADM

## 2023-04-19 RX ORDER — NALOXONE HCL 0.4 MG/ML
0.2 VIAL (ML) INJECTION AS NEEDED
Status: DISCONTINUED | OUTPATIENT
Start: 2023-04-19 | End: 2023-04-19 | Stop reason: HOSPADM

## 2023-04-19 RX ORDER — OXYCODONE AND ACETAMINOPHEN 7.5; 325 MG/1; MG/1
1 TABLET ORAL EVERY 4 HOURS PRN
Status: DISCONTINUED | OUTPATIENT
Start: 2023-04-19 | End: 2023-04-19 | Stop reason: HOSPADM

## 2023-04-19 RX ORDER — PREGABALIN 75 MG/1
75 CAPSULE ORAL ONCE
Status: COMPLETED | OUTPATIENT
Start: 2023-04-19 | End: 2023-04-19

## 2023-04-19 RX ORDER — ASPIRIN 81 MG/1
81 TABLET ORAL EVERY 12 HOURS SCHEDULED
Status: DISCONTINUED | OUTPATIENT
Start: 2023-04-19 | End: 2023-04-21 | Stop reason: HOSPADM

## 2023-04-19 RX ORDER — PROMETHAZINE HYDROCHLORIDE 25 MG/1
25 SUPPOSITORY RECTAL ONCE AS NEEDED
Status: DISCONTINUED | OUTPATIENT
Start: 2023-04-19 | End: 2023-04-19 | Stop reason: HOSPADM

## 2023-04-19 RX ORDER — FENTANYL CITRATE 50 UG/ML
50 INJECTION, SOLUTION INTRAMUSCULAR; INTRAVENOUS
Status: DISCONTINUED | OUTPATIENT
Start: 2023-04-19 | End: 2023-04-19 | Stop reason: HOSPADM

## 2023-04-19 RX ORDER — ONDANSETRON 2 MG/ML
4 INJECTION INTRAMUSCULAR; INTRAVENOUS ONCE AS NEEDED
Status: COMPLETED | OUTPATIENT
Start: 2023-04-19 | End: 2023-04-19

## 2023-04-19 RX ORDER — DEXAMETHASONE SODIUM PHOSPHATE 4 MG/ML
INJECTION, SOLUTION INTRA-ARTICULAR; INTRALESIONAL; INTRAMUSCULAR; INTRAVENOUS; SOFT TISSUE AS NEEDED
Status: DISCONTINUED | OUTPATIENT
Start: 2023-04-19 | End: 2023-04-19 | Stop reason: SURG

## 2023-04-19 RX ORDER — FENTANYL CITRATE 50 UG/ML
INJECTION, SOLUTION INTRAMUSCULAR; INTRAVENOUS AS NEEDED
Status: DISCONTINUED | OUTPATIENT
Start: 2023-04-19 | End: 2023-04-19 | Stop reason: SURG

## 2023-04-19 RX ORDER — SODIUM CHLORIDE 0.9 % (FLUSH) 0.9 %
3 SYRINGE (ML) INJECTION EVERY 12 HOURS SCHEDULED
Status: DISCONTINUED | OUTPATIENT
Start: 2023-04-19 | End: 2023-04-19 | Stop reason: HOSPADM

## 2023-04-19 RX ORDER — PHENYLEPHRINE HCL IN 0.9% NACL 1 MG/10 ML
SYRINGE (ML) INTRAVENOUS AS NEEDED
Status: DISCONTINUED | OUTPATIENT
Start: 2023-04-19 | End: 2023-04-19 | Stop reason: SURG

## 2023-04-19 RX ORDER — ONDANSETRON 2 MG/ML
INJECTION INTRAMUSCULAR; INTRAVENOUS AS NEEDED
Status: DISCONTINUED | OUTPATIENT
Start: 2023-04-19 | End: 2023-04-19 | Stop reason: SURG

## 2023-04-19 RX ORDER — ALBUTEROL SULFATE 2.5 MG/3ML
2 SOLUTION RESPIRATORY (INHALATION) EVERY 4 HOURS PRN
Status: DISCONTINUED | OUTPATIENT
Start: 2023-04-19 | End: 2023-04-21 | Stop reason: HOSPADM

## 2023-04-19 RX ORDER — NEOSTIGMINE METHYLSULFATE 0.5 MG/ML
INJECTION, SOLUTION INTRAVENOUS AS NEEDED
Status: DISCONTINUED | OUTPATIENT
Start: 2023-04-19 | End: 2023-04-19 | Stop reason: SURG

## 2023-04-19 RX ORDER — HYDROCODONE BITARTRATE AND ACETAMINOPHEN 7.5; 325 MG/1; MG/1
1 TABLET ORAL EVERY 4 HOURS PRN
Status: DISCONTINUED | OUTPATIENT
Start: 2023-04-19 | End: 2023-04-21 | Stop reason: HOSPADM

## 2023-04-19 RX ORDER — LIDOCAINE HYDROCHLORIDE 20 MG/ML
INJECTION, SOLUTION EPIDURAL; INFILTRATION; INTRACAUDAL; PERINEURAL AS NEEDED
Status: DISCONTINUED | OUTPATIENT
Start: 2023-04-19 | End: 2023-04-19 | Stop reason: SURG

## 2023-04-19 RX ORDER — LIDOCAINE HYDROCHLORIDE 10 MG/ML
0.5 INJECTION, SOLUTION EPIDURAL; INFILTRATION; INTRACAUDAL; PERINEURAL ONCE AS NEEDED
Status: DISCONTINUED | OUTPATIENT
Start: 2023-04-19 | End: 2023-04-19 | Stop reason: HOSPADM

## 2023-04-19 RX ORDER — ROCURONIUM BROMIDE 10 MG/ML
INJECTION, SOLUTION INTRAVENOUS AS NEEDED
Status: DISCONTINUED | OUTPATIENT
Start: 2023-04-19 | End: 2023-04-19 | Stop reason: SURG

## 2023-04-19 RX ORDER — HYDROCODONE BITARTRATE AND ACETAMINOPHEN 7.5; 325 MG/1; MG/1
2 TABLET ORAL EVERY 4 HOURS PRN
Status: DISCONTINUED | OUTPATIENT
Start: 2023-04-19 | End: 2023-04-21 | Stop reason: HOSPADM

## 2023-04-19 RX ORDER — ONDANSETRON 4 MG/1
4 TABLET, FILM COATED ORAL EVERY 6 HOURS PRN
Status: DISCONTINUED | OUTPATIENT
Start: 2023-04-19 | End: 2023-04-21 | Stop reason: HOSPADM

## 2023-04-19 RX ORDER — FAMOTIDINE 10 MG/ML
20 INJECTION, SOLUTION INTRAVENOUS ONCE
Status: COMPLETED | OUTPATIENT
Start: 2023-04-19 | End: 2023-04-19

## 2023-04-19 RX ORDER — SODIUM CHLORIDE 0.9 % (FLUSH) 0.9 %
3-10 SYRINGE (ML) INJECTION AS NEEDED
Status: DISCONTINUED | OUTPATIENT
Start: 2023-04-19 | End: 2023-04-19 | Stop reason: HOSPADM

## 2023-04-19 RX ORDER — HYDROMORPHONE HYDROCHLORIDE 1 MG/ML
0.5 INJECTION, SOLUTION INTRAMUSCULAR; INTRAVENOUS; SUBCUTANEOUS
Status: DISCONTINUED | OUTPATIENT
Start: 2023-04-19 | End: 2023-04-21 | Stop reason: HOSPADM

## 2023-04-19 RX ORDER — ONDANSETRON 2 MG/ML
4 INJECTION INTRAMUSCULAR; INTRAVENOUS EVERY 6 HOURS PRN
Status: DISCONTINUED | OUTPATIENT
Start: 2023-04-19 | End: 2023-04-21 | Stop reason: HOSPADM

## 2023-04-19 RX ORDER — CEFAZOLIN SODIUM 2 G/100ML
2 INJECTION, SOLUTION INTRAVENOUS EVERY 8 HOURS
Status: COMPLETED | OUTPATIENT
Start: 2023-04-19 | End: 2023-04-20

## 2023-04-19 RX ORDER — GLYCOPYRROLATE 0.2 MG/ML
INJECTION INTRAMUSCULAR; INTRAVENOUS AS NEEDED
Status: DISCONTINUED | OUTPATIENT
Start: 2023-04-19 | End: 2023-04-19 | Stop reason: SURG

## 2023-04-19 RX ORDER — CELECOXIB 200 MG/1
400 CAPSULE ORAL ONCE
Status: COMPLETED | OUTPATIENT
Start: 2023-04-19 | End: 2023-04-19

## 2023-04-19 RX ORDER — FLUMAZENIL 0.1 MG/ML
0.2 INJECTION INTRAVENOUS AS NEEDED
Status: DISCONTINUED | OUTPATIENT
Start: 2023-04-19 | End: 2023-04-19 | Stop reason: HOSPADM

## 2023-04-19 RX ORDER — KETOROLAC TROMETHAMINE 15 MG/ML
15 INJECTION, SOLUTION INTRAMUSCULAR; INTRAVENOUS EVERY 6 HOURS
Status: DISPENSED | OUTPATIENT
Start: 2023-04-19 | End: 2023-04-20

## 2023-04-19 RX ORDER — DIPHENHYDRAMINE HYDROCHLORIDE 50 MG/ML
12.5 INJECTION INTRAMUSCULAR; INTRAVENOUS
Status: DISCONTINUED | OUTPATIENT
Start: 2023-04-19 | End: 2023-04-19 | Stop reason: HOSPADM

## 2023-04-19 RX ORDER — HYDRALAZINE HYDROCHLORIDE 20 MG/ML
5 INJECTION INTRAMUSCULAR; INTRAVENOUS
Status: DISCONTINUED | OUTPATIENT
Start: 2023-04-19 | End: 2023-04-19 | Stop reason: HOSPADM

## 2023-04-19 RX ORDER — PROMETHAZINE HYDROCHLORIDE 25 MG/1
25 TABLET ORAL ONCE AS NEEDED
Status: DISCONTINUED | OUTPATIENT
Start: 2023-04-19 | End: 2023-04-19 | Stop reason: HOSPADM

## 2023-04-19 RX ORDER — DOCUSATE SODIUM 100 MG/1
100 CAPSULE, LIQUID FILLED ORAL 2 TIMES DAILY PRN
Status: DISCONTINUED | OUTPATIENT
Start: 2023-04-19 | End: 2023-04-21 | Stop reason: HOSPADM

## 2023-04-19 RX ORDER — TRAMADOL HYDROCHLORIDE 50 MG/1
50 TABLET ORAL EVERY 8 HOURS PRN
Status: DISCONTINUED | OUTPATIENT
Start: 2023-04-19 | End: 2023-04-21 | Stop reason: HOSPADM

## 2023-04-19 RX ORDER — IPRATROPIUM BROMIDE AND ALBUTEROL SULFATE 2.5; .5 MG/3ML; MG/3ML
3 SOLUTION RESPIRATORY (INHALATION) ONCE AS NEEDED
Status: DISCONTINUED | OUTPATIENT
Start: 2023-04-19 | End: 2023-04-19 | Stop reason: HOSPADM

## 2023-04-19 RX ORDER — LABETALOL HYDROCHLORIDE 5 MG/ML
5 INJECTION, SOLUTION INTRAVENOUS
Status: DISCONTINUED | OUTPATIENT
Start: 2023-04-19 | End: 2023-04-19 | Stop reason: HOSPADM

## 2023-04-19 RX ORDER — SCOLOPAMINE TRANSDERMAL SYSTEM 1 MG/1
1 PATCH, EXTENDED RELEASE TRANSDERMAL
Status: DISCONTINUED | OUTPATIENT
Start: 2023-04-19 | End: 2023-04-19 | Stop reason: HOSPADM

## 2023-04-19 RX ORDER — SODIUM CHLORIDE, SODIUM LACTATE, POTASSIUM CHLORIDE, CALCIUM CHLORIDE 600; 310; 30; 20 MG/100ML; MG/100ML; MG/100ML; MG/100ML
9 INJECTION, SOLUTION INTRAVENOUS CONTINUOUS
Status: DISCONTINUED | OUTPATIENT
Start: 2023-04-19 | End: 2023-04-19

## 2023-04-19 RX ORDER — MIDAZOLAM HYDROCHLORIDE 1 MG/ML
1 INJECTION INTRAMUSCULAR; INTRAVENOUS
Status: DISCONTINUED | OUTPATIENT
Start: 2023-04-19 | End: 2023-04-19 | Stop reason: HOSPADM

## 2023-04-19 RX ORDER — NALOXONE HCL 0.4 MG/ML
0.1 VIAL (ML) INJECTION
Status: DISCONTINUED | OUTPATIENT
Start: 2023-04-19 | End: 2023-04-21 | Stop reason: HOSPADM

## 2023-04-19 RX ORDER — MELOXICAM 15 MG/1
15 TABLET ORAL DAILY
Status: DISCONTINUED | OUTPATIENT
Start: 2023-04-20 | End: 2023-04-21 | Stop reason: HOSPADM

## 2023-04-19 RX ORDER — ACETAMINOPHEN 500 MG
1000 TABLET ORAL ONCE
Status: COMPLETED | OUTPATIENT
Start: 2023-04-19 | End: 2023-04-19

## 2023-04-19 RX ORDER — PROPOFOL 10 MG/ML
VIAL (ML) INTRAVENOUS AS NEEDED
Status: DISCONTINUED | OUTPATIENT
Start: 2023-04-19 | End: 2023-04-19 | Stop reason: SURG

## 2023-04-19 RX ADMIN — HYDROMORPHONE HYDROCHLORIDE 0.5 MG: 1 INJECTION, SOLUTION INTRAMUSCULAR; INTRAVENOUS; SUBCUTANEOUS at 13:34

## 2023-04-19 RX ADMIN — TRANEXAMIC ACID 1000 MG: 1 INJECTION, SOLUTION INTRAVENOUS at 12:30

## 2023-04-19 RX ADMIN — FENTANYL CITRATE 50 MCG: 50 INJECTION, SOLUTION INTRAMUSCULAR; INTRAVENOUS at 12:35

## 2023-04-19 RX ADMIN — GLYCOPYRROLATE 0.4 MCG: 1 INJECTION INTRAMUSCULAR; INTRAVENOUS at 14:21

## 2023-04-19 RX ADMIN — PROPOFOL 25 MCG/KG/MIN: 10 INJECTION, EMULSION INTRAVENOUS at 12:30

## 2023-04-19 RX ADMIN — ACETAMINOPHEN 1000 MG: 500 TABLET ORAL at 09:47

## 2023-04-19 RX ADMIN — ASPIRIN 81 MG: 81 TABLET, COATED ORAL at 20:22

## 2023-04-19 RX ADMIN — FENTANYL CITRATE 50 MCG: 50 INJECTION, SOLUTION INTRAMUSCULAR; INTRAVENOUS at 10:52

## 2023-04-19 RX ADMIN — FAMOTIDINE 20 MG: 10 INJECTION INTRAVENOUS at 11:00

## 2023-04-19 RX ADMIN — FENTANYL CITRATE 50 MCG: 50 INJECTION, SOLUTION INTRAMUSCULAR; INTRAVENOUS at 12:45

## 2023-04-19 RX ADMIN — ONDANSETRON 4 MG: 2 INJECTION INTRAMUSCULAR; INTRAVENOUS at 13:05

## 2023-04-19 RX ADMIN — Medication 100 MCG: at 14:05

## 2023-04-19 RX ADMIN — HYDROMORPHONE HYDROCHLORIDE 0.5 MG: 1 INJECTION, SOLUTION INTRAMUSCULAR; INTRAVENOUS; SUBCUTANEOUS at 13:05

## 2023-04-19 RX ADMIN — SCOPALAMINE 1 PATCH: 1 PATCH, EXTENDED RELEASE TRANSDERMAL at 11:31

## 2023-04-19 RX ADMIN — DEXAMETHASONE SODIUM PHOSPHATE 4 MG: 4 INJECTION, SOLUTION INTRAMUSCULAR; INTRAVENOUS at 10:53

## 2023-04-19 RX ADMIN — SODIUM CHLORIDE, POTASSIUM CHLORIDE, SODIUM LACTATE AND CALCIUM CHLORIDE: 600; 310; 30; 20 INJECTION, SOLUTION INTRAVENOUS at 13:00

## 2023-04-19 RX ADMIN — LIDOCAINE HYDROCHLORIDE 100 MG: 20 INJECTION, SOLUTION EPIDURAL; INFILTRATION; INTRACAUDAL; PERINEURAL at 12:23

## 2023-04-19 RX ADMIN — PREGABALIN 75 MG: 75 CAPSULE ORAL at 09:47

## 2023-04-19 RX ADMIN — ONDANSETRON 4 MG: 2 INJECTION INTRAMUSCULAR; INTRAVENOUS at 18:30

## 2023-04-19 RX ADMIN — CEFAZOLIN SODIUM 2 G: 2 INJECTION, SOLUTION INTRAVENOUS at 12:10

## 2023-04-19 RX ADMIN — SODIUM CHLORIDE 100 ML/HR: 9 INJECTION, SOLUTION INTRAVENOUS at 20:22

## 2023-04-19 RX ADMIN — DEXAMETHASONE SODIUM PHOSPHATE 8 MG: 4 INJECTION, SOLUTION INTRAMUSCULAR; INTRAVENOUS at 12:35

## 2023-04-19 RX ADMIN — ROCURONIUM BROMIDE 40 MG: 50 INJECTION INTRAVENOUS at 12:23

## 2023-04-19 RX ADMIN — ROPIVACAINE HYDROCHLORIDE 20 ML: 5 INJECTION, SOLUTION EPIDURAL; INFILTRATION; PERINEURAL at 10:53

## 2023-04-19 RX ADMIN — Medication 100 MCG: at 14:19

## 2023-04-19 RX ADMIN — NEOSTIGMINE METHYLSULFATE 4 MG: 0.5 INJECTION INTRAVENOUS at 14:21

## 2023-04-19 RX ADMIN — CELECOXIB 400 MG: 200 CAPSULE ORAL at 09:47

## 2023-04-19 RX ADMIN — MIDAZOLAM 1 MG: 1 INJECTION INTRAMUSCULAR; INTRAVENOUS at 10:52

## 2023-04-19 RX ADMIN — KETOROLAC TROMETHAMINE 15 MG: 15 INJECTION, SOLUTION INTRAMUSCULAR; INTRAVENOUS at 20:22

## 2023-04-19 RX ADMIN — PROPOFOL 200 MG: 10 INJECTION, EMULSION INTRAVENOUS at 12:23

## 2023-04-19 RX ADMIN — CEFAZOLIN SODIUM 2 G: 2 INJECTION, SOLUTION INTRAVENOUS at 23:34

## 2023-04-19 RX ADMIN — TRANEXAMIC ACID 1000 MG: 1 INJECTION, SOLUTION INTRAVENOUS at 14:05

## 2023-04-19 RX ADMIN — SODIUM CHLORIDE, POTASSIUM CHLORIDE, SODIUM LACTATE AND CALCIUM CHLORIDE 9 ML/HR: 600; 310; 30; 20 INJECTION, SOLUTION INTRAVENOUS at 10:35

## 2023-04-19 NOTE — DISCHARGE PLACEMENT REQUEST
"Evie Samayoa (57 y.o. Female)     Date of Birth   1965    Social Security Number       Address   [address] [place] [address]    Home Phone   [phone]    MRN   [id number]       Gnosticism   Unknown    Marital Status                               Admission Date   4/19/23    Admission Type   Elective    Admitting Provider   Wilfredo Schaffer MD    Attending Provider   Wilfredo Schaffer MD    Department, Room/Bed   [facility] OR, [facility] OR/[facility] OR       Discharge Date       Discharge Disposition       Discharge Destination                               Attending Provider: Wilfredo Schaffer MD    Allergies: No Known Allergies    Isolation: None   Infection: None   Code Status: Not on file    Ht: 160 cm (63\")   Wt: 85.7 kg (189 lb)    Admission Cmt: None   Principal Problem: None                Active Insurance as of 4/19/2023     Primary Coverage     Payor Plan Insurance Group Employer/Plan Group    ANTHEM BLUE CROSS ANTHEM BLUE CROSS BLUE SHIELD PPO [id number]     Payor Plan Address Payor Plan Phone Number Payor Plan Fax Number Effective Dates    [address] [phone]  6/1/2022 - None Entered    [facility] [id number]       Subscriber Name Subscriber Birth Date Member ID       EVIE SAMAYOA 1965 [id number]                 Emergency Contacts      (Rel.) Home Phone Work Phone Mobile Phone    David Samayoa (Spouse) -- -- [phone]              "

## 2023-04-19 NOTE — ANESTHESIA PREPROCEDURE EVALUATION
Anesthesia Evaluation     NPO Solid Status: > 8 hours  NPO Liquid Status: > 8 hours           Airway   Mallampati: I  TM distance: >3 FB  No difficulty expected  Dental      Pulmonary    Cardiovascular   Exercise tolerance: good (4-7 METS)        Neuro/Psych  GI/Hepatic/Renal/Endo    (+) obesity,       Musculoskeletal     Abdominal    Substance History      OB/GYN          Other   arthritis,                      Anesthesia Plan    ASA 3     general     (Regional for POPC PSR)  intravenous induction     Anesthetic plan, risks, benefits, and alternatives have been provided, discussed and informed consent has been obtained with: patient.    Plan discussed with CRNA.        CODE STATUS:

## 2023-04-19 NOTE — OP NOTE
Conversion failed left unicompartmental arthroplasty to total knee arthroplasty    Jennifer Samayoa  4/19/2023    Pre-op Diagnosis: Catastrophic failure left unicompartmental arthroplasty  Post-op Diagnosis: Same  Procedure: Conversion failed left unicompartmental arthroplasty to left total knee arthroplasty CPT code 16500    22 modifier secondary to difficulty of procedure with removal of femoral and tibial prosthesis and significant bone loss      Surgeon:  Wilfredo Schaffer MD  Assistant: Monty Fay NP, CFA  Anesthesia: General with Block, Anesthesiologist: Artie Mayberry MD  CRNA: Sofía Dunne CRNA  Staff: Circulator: Gail Rosario RN; Hanane Mooney RN  Scrub Person: Jono Morocho  Vendor Representative: Manuelito Camargo  Assistant: Monty Fay, VINAYAK CFA  Estimated Blood Loss: 100ml  Specimens:   Order Name Source Comment Collection Info Order Time   ANAEROBIC CULTURE Leg, Left left knee aerobic/anaerobic culture  Collected By: Wilfredo Schaffer MD 4/19/2023 12:52 PM     Release to patient   Routine Release        TISSUE / BONE CULTURE Leg, Left left knee aerobic/anaerobic culture  Collected By: Wilfredo Schaffer MD 4/19/2023 12:52 PM     Release to patient   Routine Release          Drains: none  Complications: None    Components Utilized:    Implant Name Type Inv. Item Serial No.  Lot No. LRB No. Used Action   DEV CONTRL TISS STRATAFIX PDS PLS OS6 REV SZ1 18IN 45CM - [id number] Implant DEV CONTRL TISS STRATAFIX PDS PLS OS6 REV SZ1 18IN 45CM  [employer]  DIV OF J AND J [id number] Left 1 Implanted   DEV CONTRL TISS STRATAFIX SPIRAL MNCRYL UD 3/0 PLS 30CM - [id number] Implant DEV CONTRL TISS STRATAFIX SPIRAL MNCRYL UD 3/0 PLS 30CM  [employer]  DIV OF J AND J [id number] Left 1 Implanted   ART/SRF KN PERSONA/VE PS EF 8TO11 12MM LT - [id number] Implant ART/SRF KN PERSONA/VE PS EF 8TO11 12MM LT  [employer] [id number] Left 1 Implanted   STEM FEM/KN PERSONA REV SPLINED  STR [id number] - [id number] Implant STEM FEM/KN PERSONA REV SPLINED STR [id number]  [employer] [id number] Left 1 Implanted   COMP FEM/KN PERSONA CR CMT NRW SZ9 LT - [id number] Implant COMP FEM/KN PERSONA CR CMT NRW SZ9 LT  [employer] [id number] Left 1 Implanted   BLCK 1/2 TIB/KN PERSONA REV LAT/LT SZEF 5MM - [id number] Implant BLCK 1/2 TIB/KN PERSONA REV LAT/LT SZEF 5MM  [employer] [id number] Left 1 Implanted   Persona Revision Tibial Augment Half Block Left Medial Size EF 10mm Thickness    [employer] [id number] Left 1 Implanted   WAX BONE HEMO KATLIN 2.5G - [id number] Implant WAX BONE HEMO KATLIN 2.5G  [employer]  DIV OF J AND J [id number] Left 1 Implanted   KEEL FIX TIB/KN PERSONA REV CMT SURAJ LT - [id number] Implant KEEL FIX TIB/KN PERSONA REV CMT SURAJ LT  [employer] [id number] Left 1 Implanted   CMT BONE PALACOS R HI/VISC 1X40 - [id number] Implant CMT BONE PALACOS R HI/VISC 1X40  [facility] [id number] Left 2 Implanted       Indication for Procedure:  This patient is a 57 y.o. female who has a history of a left unicompartmental arthroplasty performed by another physician.  The patient presented to my office with pain and instability in the knee.  Radiographs demonstrated failure of her left partial knee replacement with subsidence of the tibial component, subluxation of the bearing as well as proximal and posterior migration of her femoral component.  Surgical options and non-surgical options were discussed in detail and to the patient's satisfaction.  Surgical intervention was recommended based on the patient's injury and functional status.  A work-up for infection was performed and was negative including an aspiration.  The patient was noted to have a suboptimal soft tissue envelope with venous stasis dermatitis to the left lower extremity however this is well distal to her incision site.  She was counseled on the increased risks of infection however nonoperative management was not a reasonable treatment for this patient  due to her young age as well as progressive pain and instability in her knee and difficulty with ambulation and activities of daily living    The risks and benefits of surgery were discussed with patient and informed consent was obtained.  Risks include but are not limited to, infection, bleeding, nerve injury, blood clots, risks associated with anesthesia, need for further surgery, persistent pain, and possibly death.    Protocols for intravenous antibiotics and venous thrombosis were followed for this patient.  IV antibiotics were infused prior to surgery and will be discontinued within 24 hours of completion of the surgical procedure.       DESCRIPTION OF PROCEDURE:     The patient was seen in Preoperative Holding Area where their surgical site was marked. Preoperative antibiotics were received. H&P and consent updated. They were taken to the Operating Room and provided general anesthesia on the Operating Room table.  An adductor canal block was administered in the preoperative holding area.  The extremity was prepped and draped in usual two-stage fashion after a well-padded nonsterile tourniquet was placed in the left proximal thigh.  Gloves were changed.  A formal surgical timeout confirmed the correct patient, procedure be performed as well as cefazolin and tranexamic acid were administered within 60 minutes of incision per SCIP guidelines.  I remember the team was in agreement.  Next an Esmarch was used to exsanguinate the extremity tourniquet inflated to 250 mmHg.  The patient had a very small medial based incision.  I extended this incision both proximally and distally.  Full-thickness flaps were elevated medially and laterally.  A medial parapatellar arthrotomy was made.  Normal-appearing synovial fluid was encountered.  This was sent for culture and Gram stain per routine fashion.  There is no evidence of purulence.  The medial collateral ligament and medial capsule was elevated off the medial proximal  tibia using Bovie cautery in a subperiosteal fashion.  It was noted that the bearing had dislocated posteriorly.  The knee was incredibly unstable.  I flexed the knee and it was noted that the femoral component had subsided into the distal femur by approximately 1 cm however was not overtly loose.  The tibial component was also not overtly loose.  The patella was everted and a lateral denervation was performed as well as a circumferential ostectomy.  I then flexed the knee once again and her attention towards the femur.  Notch osteophytes were removed.  I opened the femoral canal using a drill.  The intramedullary guide was then placed and I elected for a standard resection 9 mm.  I made my resection based upon the intramedullary guide and I was able to get my blade just underneath the anterior aspect of the femoral component.  The lateral cut was completed.  The femoral guide was then removed I completed the cut using a reciprocating saw as well as osteotome.  The femoral component was removed without causing any bone loss.  However, there was metallosis debris seen with some osteolysis present on the posterior medial femoral condyle.  I then proceeded to the tibia.  Retractors were placed around the tibia and was subluxed anteriorly.  The tibial component appeared to have subsided and there was a rind of bone both posteriorly as well as medially.  The MCL was retracted and protected.  I then used a reciprocating saw followed by an osteotome to remove the tibial component without causing any bone loss.  It was noted that there was significant and excessive slope and the tibial component secondary to subsidence.  I then opened the tibial canal using a drill and elected for using an intramedullary resection.  Due to the significant bone loss medially I had planned for using a revision tibial component with an augment.  Drilling was performed and then I sequentially reamed up to a size 15.  I initially felt that a 5  mm augment would be suitable medially so I used the intramedullary guide to base my resection.  I resected just a few millimeters medially to get to healthy bone and then 5 mm less on the lateral plateau.  The bone fragment was removed.  At this point, I trialed the knee in extension and felt that I did have an appropriate extension gap laterally.  I proceeded to femoral sizing.  I used a 5 mm augment medially secondary to the bone loss and I used a posterior referencing guide.  I also reference the transepicondylar axis.  The femur was sized at a size 9.  The cutting block was placed it was checked with an lucien wing.  Resections were then made.  The lateral meniscal remnant was removed the posterior cruciate ligament was intact and was protected.  The posterior medial capsule was significantly traumatized and stretched due to the posterior subluxation of the knee and tibial bearing.  This was protected.  The tibia was then subluxed anteriorly and tibial preparation was completed.  There was no central bone loss so a metaphyseal cone was not utilized.  I placed a 5 mm augment medially, no augments laterally.  It was determined that no offset coupling was necessary and I had excellent tibial coverage and appropriate external rotation.  I then trialed the knee with a CR femur and felt that the knee was very stable to varus and valgus stress and had terminal extension with no recurvatum with approximately a 16 mm insert.  There was no mid flexion instability.  Sagittal plane motion was less than 5 mm at 90 degrees of flexion and the patella was tracking centrally.  Trial implants were removed.  At this point, I elected to utilize an augment laterally as well so that I could get to a smaller polyethylene insert.  The bone ends were cleansed using pulsatile saline lavage.  Local injection was placed in the posterior capsule medially.  I opened the definitive tibial implant as well as a 10 mm augment medially 5 mm  augment laterally.  Cement was mixed on the back table under vacuum.  The tibia was cemented and hybrid fixation fashion.  The femoral component was also cemented.  The knee was placed in terminal extension with a trial insert.  Once the cement had fully hardened I range the knee once again and felt the knee had the best stability with a 12 mm insert.  A medial congruent insert was utilized.  The tourniquet was deflated hemostasis was confirmed.  There was some generalized oozing from the synovitis that was excised.  A 10 Martiniquais Hemovac drain was placed.  Once hemostasis was appropriate, the wound was once again irrigated using pulsatile saline lavage with Betadine.  The arthrotomy was then closed using #1 Ethibond followed by #1 strata fix PDS.  The deep layers closed using 0 Vicryl suture followed by 2-0 Vicryl suture and then 3-0 Monocryl Dermabond and sterile silver impregnated occlusive dressing.  All counts were correct at the end of the procedure    Postoperative Plan:  Patient be weightbearing as tolerated to the left lower extremity    SCDs and aspirin 81 mg twice daily for DVT prophylaxis    Cefazolin for antibiotic prophylaxis    Wilfredo Schaffer MD

## 2023-04-19 NOTE — ANESTHESIA PROCEDURE NOTES
Airway  Urgency: elective    Date/Time: 4/19/2023 12:26 PM  Airway not difficult    General Information and Staff    Patient location during procedure: OR  Anesthesiologist: Bright Blackwell MD    Indications and Patient Condition  Indications for airway management: airway protection    Preoxygenated: yes (pt pre-O2 with 100% O2)  Mask difficulty assessment: 2 - vent by mask + OA or adjuvant +/- NMBA (easy BMV )    Final Airway Details  Final airway type: endotracheal airway      Successful airway: ETT  Cuffed: yes   Successful intubation technique: direct laryngoscopy  Endotracheal tube insertion site: oral  Blade: Nola  Blade size: 3  ETT size (mm): 7.0  Cormack-Lehane Classification: grade I - full view of glottis  Placement verified by: chest auscultation and capnometry   Cuff volume (mL): 7  Measured from: lips  ETT/EBT  to lips (cm): 21  Number of attempts at approach: 1  Assessment: lips, teeth, and gum same as pre-op and atraumatic intubation    Additional Comments  ATOETx1. No change in dentition.

## 2023-04-19 NOTE — H&P
ORTHOPEDIC SURGERY PRE-OP HISTORY AND PHYSICAL      Patient: Jennifer Samayoa  Date of Admission: 2023  8:59 AM  YOB: 1965  Medical Record Number: [id number]  Attending Physician: Wilfredo Schaffer MD  Consulting Physician: Wilfredo Schaffer MD    CHIEF COMPLAINT: Left Knee Pain.    HISTORY OF PRESENT ILLNESS: Patient is a 57 y.o. female presents to [facility] with above complaints.  The patient has a history of a left unicondylar knee arthroplasty performed by another orthopedic physician at an outside hospital a few years ago.  She is found to have catastrophic failure of the implant as well as adjacent compartment disease.  Her knee is unstable.  She has failed conservative treatment.  A work-up for infection has been performed and is also negative.  She presents today for revision.  ALLERGIES: No Known Allergies    HOME MEDICATIONS:  Medications Prior to Admission   Medication Sig Dispense Refill Last Dose   • albuterol sulfate  (90 Base) MCG/ACT inhaler Inhale 2 puffs Every 4 (Four) Hours As Needed for Wheezing. 18 g 1 Past Month   • Chlorhexidine Gluconate Cloth 2 % pads Apply  topically.   2023       Past Medical History:   Diagnosis Date   • Allergic Seasonal   • Asthma    • Atopic rhinitis    • Osteoarthritis of knee      Past Surgical History:   Procedure Laterality Date   • ADENOIDECTOMY     •  SECTION  2002   • COLONOSCOPY     • HAND SURGERY  Both hands 6 years ago   • JOINT REPLACEMENT      partial left knee   • KNEE ARTHROPLASTY, PARTIAL REPLACEMENT      left knee   • MULTIPLE TOOTH EXTRACTIONS     • TONSILLECTOMY       Social History     Occupational History   • Not on file   Tobacco Use   • Smoking status: Never   • Smokeless tobacco: Never   Vaping Use   • Vaping Use: Never used   Substance and Sexual Activity   • Alcohol use: Not Currently     Alcohol/week: 2.0 standard drinks     Types: 2 Glasses of wine per week     Comment:  occ   • Drug use: Never   • Sexual activity: Not Currently     Partners: Male     Birth control/protection: Abstinence, None      Social History     Social History Narrative   • Not on file     Family History   Problem Relation Age of Onset   • Heart attack Mother    • Cancer Father    • No Known Problems Sister    • No Known Problems Brother    • No Known Problems Daughter    • No Known Problems Son    • No Known Problems Maternal Aunt    • No Known Problems Paternal Aunt    • No Known Problems Maternal Grandmother    • No Known Problems Paternal Grandmother    • BRCA 1/2 Neg Hx    • Breast cancer Neg Hx    • Colon cancer Neg Hx    • Endometrial cancer Neg Hx    • Ovarian cancer Neg Hx    • Malig Hyperthermia Neg Hx        REVIEW OF SYSTEMS:    HEENT: Patient denies any headaches, vision changes, change in hearing, or tinnitus, Patient denies epistaxis, sinus pain, hoarseness, or dysphagia   Pulmonary: Patient denies any cough, congestion, acute change in SOA or wheezing.   Cardiovascular: Patient denies any change in chest pain, dyspnea, palpitations, weakness, intolerance of exercise, varicosities, change in murmur   Gastrointestinal:  Patient denies change in appetite, melena, change in bowel habits.   Genital/Urinary: Patient denies dysuria, change in color of urine, change in frequency of urination, pain with urgency, change in incontinence, retention.   Musculoskeletal: Patient denies complaints of acute changes in symptoms of other joints not mentioned above.   Neurological: Patient denies changes in dizziness, tremor, ataxia, or difficulty in speaking or changes in memory.   Endocrine system: Patient denies acute changes in tremors, palpitations, polyuria, polydipsia, polyphagia, diaphoresis, exophthalmos, or goiter.   Psychological: Patient denies thoughts/plans of harming self or other; denies acute changes in depression,  insomnia, night terrors, esau, disorientation.   Skin: The patient does have  venous stasis dermatitis skin changes seen on the bilateral lower extremities.   Hematopoietic: Patient denies current bleeding, epistaxis, hematuria, or melena.    PHYSICAL EXAM:   Vitals:  Vitals:    04/19/23 0915   BP: 166/99   BP Location: Left arm   Patient Position: Sitting   Pulse: 80   Resp: 16   Temp: 97.9 °F (36.6 °C)   TempSrc: Oral   SpO2: 99%       General:  57 y.o. female who appears about stated age.    Alert, cooperative, in no acute distress                       Head:    Normocephalic, without obvious abnormality, atraumatic   Eyes:            Lids and lashes normal, conjunctivae and sclerae normal, no         icterus, no pallor, corneas clear, PERRLA   Ears:    Ears appear intact with no abnormalities noted   Throat:   No oral lesions, no thrush, oral mucosa moist   Neck:   No adenopathy, supple, trachea midline, no JVD   Back:     Limited exam shows no severe kyphosis present,no visible           erythema, no excessive  tenderness to palpation.    Lungs:     Respirations regular, even and unlabored.     Heart:    Normal rate, Pulses palpable   Chest Wall:    No abnormalities observed.   Abdomen:     Normal bowel sounds, no masses, no organomegaly, soft              non-tender, non-distended, no guarding, no rebound                      tenderness   Rectal:     Deferred   Pulses:   Pulses palpable and equal bilaterally   Skin:   No bleeding, bruising or rash   Lymph nodes:   No palpable adenopathy   Extremities:     Left Knee: There is a well-healed anterior incision.  She does have varus valgus instability in the knee throughout range of motion.  Venous stasis dermatitis skin changes are seen circumferentially from the mid calf distally.  No open wounds or ulceration is seen.  No erythema..      DIAGNOSTIC TEST:  No visits with results within 2 Day(s) from this visit.   Latest known visit with results is:   Pre-Admission Testing on 03/31/2023   Component Date Value Ref Range Status   • WBC  03/31/2023 6.43  3.40 - 10.80 10*3/mm3 Final   • RBC 03/31/2023 4.69  3.77 - 5.28 10*6/mm3 Final   • Hemoglobin 03/31/2023 12.8  12.0 - 15.9 g/dL Final   • Hematocrit 03/31/2023 39.3  34.0 - 46.6 % Final   • MCV 03/31/2023 83.8  79.0 - 97.0 fL Final   • MCH 03/31/2023 27.3  26.6 - 33.0 pg Final   • MCHC 03/31/2023 32.6  31.5 - 35.7 g/dL Final   • RDW 03/31/2023 14.7  12.3 - 15.4 % Final   • RDW-SD 03/31/2023 45.0  37.0 - 54.0 fl Final   • MPV 03/31/2023 10.2  6.0 - 12.0 fL Final   • Platelets 03/31/2023 226  140 - 450 10*3/mm3 Final   • Glucose 03/31/2023 100 (H)  65 - 99 mg/dL Final   • BUN 03/31/2023 12  6 - 20 mg/dL Final   • Creatinine 03/31/2023 0.81  0.57 - 1.00 mg/dL Final   • Sodium 03/31/2023 142  136 - 145 mmol/L Final   • Potassium 03/31/2023 4.3  3.5 - 5.2 mmol/L Final   • Chloride 03/31/2023 107  98 - 107 mmol/L Final   • CO2 03/31/2023 24.1  22.0 - 29.0 mmol/L Final   • Calcium 03/31/2023 10.1  8.6 - 10.5 mg/dL Final   • Total Protein 03/31/2023 7.8  6.0 - 8.5 g/dL Final   • Albumin 03/31/2023 4.3  3.5 - 5.2 g/dL Final   • ALT (SGPT) 03/31/2023 14  1 - 33 U/L Final   • AST (SGOT) 03/31/2023 13  1 - 32 U/L Final   • Alkaline Phosphatase 03/31/2023 136 (H)  39 - 117 U/L Final   • Total Bilirubin 03/31/2023 0.7  0.0 - 1.2 mg/dL Final   • Globulin 03/31/2023 3.5  gm/dL Final   • A/G Ratio 03/31/2023 1.2  g/dL Final   • BUN/Creatinine Ratio 03/31/2023 14.8  7.0 - 25.0 Final   • Anion Gap 03/31/2023 10.9  5.0 - 15.0 mmol/L Final   • eGFR 03/31/2023 84.3  >60.0 mL/min/1.73 Final   • Protime 03/31/2023 13.5  11.7 - 14.2 Seconds Final   • INR 03/31/2023 1.02  0.90 - 1.10 Final   • Hemoglobin A1C 03/31/2023 5.10  4.80 - 5.60 % Final   • QT Interval 03/31/2023 402  ms Final       No results found.      ASSESSMENT:  Left Knee Osteoarthritis    * No active hospital problems. *      PLAN:    Conversion catastrophic failure left unicompartmental knee arthroplasty to total knee arthroplasty    Risks and benefits of  surgical intervention were discussed in detail with the patient.  Risks of infection, fracture, dislocation, extremity length discrepancy, neurovascular injury, persistent pain, medical risks, anesthetic risk, need for additional surgery, deep venous thrombosis, pulmonary embolism and death.  In particular, advised the patient that given her severe venous stasis dermatitis, I am concerned that her risk of infection is higher however given the catastrophic failure for implant we have optimized her as best possible and nonoperative management will not allow her to continue to ambulate safely.  She understands risks and benefits.    The above diagnosis and treatment plan was discussed with the patient and/or family.  They were educated in both non-surgical and surgical treatment options for their condition.   They were given the opportunity to ask questions and were answered to their satisfaction.  They agreed to proceed with the above treatment plan.        Wilfredo Schaffer MD  Date: 4/19/2023

## 2023-04-19 NOTE — ANESTHESIA POSTPROCEDURE EVALUATION
Patient: Jennifer Samayoa    Procedure Summary     Date: 04/19/23 Room / Location:  [facility] OR 09 /  [facility]    Anesthesia Start: 1214 Anesthesia Stop: 1449    Procedure: CONVERSION LEFT PARTIAL KNEE REPLACEMENT TO TOTAL KNEE REPLACEMENT (Left: Knee) Diagnosis:     Surgeons: Wilfredo Schaffer MD Provider: Artie Mayberry MD    Anesthesia Type: general ASA Status: 3          Anesthesia Type: general    Vitals  Vitals Value Taken Time   /78 04/19/23 1530   Temp 37.1 °C (98.8 °F) 04/19/23 1450   Pulse 54 04/19/23 1543   Resp 16 04/19/23 1530   SpO2 97 % 04/19/23 1543   Vitals shown include unvalidated device data.        Post Anesthesia Care and Evaluation    Patient location during evaluation: PACU  Patient participation: complete - patient participated  Level of consciousness: awake  Pain management: adequate    Airway patency: patent  Anesthetic complications: No anesthetic complications  PONV Status: none  Cardiovascular status: acceptable  Respiratory status: acceptable  Hydration status: acceptable    Comments: Patient seen and examined postoperatively; vital signs stable; SpO2 greater than or equal to 90%; cardiopulmonary status stable; nausea/vomiting adequately controlled; pain adequately controlled; no apparent anesthesia complications; patient discharged from anesthesia care when discharge criteria were met

## 2023-04-19 NOTE — ANESTHESIA PROCEDURE NOTES
Peripheral Block      Patient reassessed immediately prior to procedure    Patient location during procedure: pre-op  Start time: 4/19/2023 10:53 AM  Stop time: 4/19/2023 10:56 AM  Reason for block: procedure for pain, at surgeon's request and post-op pain management  Performed by  Anesthesiologist: Artie Mayberry MD  Preanesthetic Checklist  Completed: patient identified, IV checked, site marked, risks and benefits discussed, surgical consent, monitors and equipment checked, pre-op evaluation and timeout performed  Prep:  Pt Position: supine  Sterile barriers:cap, gloves, sterile barriers, washed/disinfected hands and mask  Prep: ChloraPrep  Patient monitoring: blood pressure monitoring, continuous pulse oximetry and EKG  Procedure    Sedation: yes  Performed under: local infiltration  Guidance:ultrasound guided  Images:still images obtained, printed/placed on chart    Laterality:left  Block Type:adductor canal block  Injection Technique:single-shot  Needle Type:echogenic  Resistance on Injection: none    Medications Used: dexamethasone (DECADRON) injection - Injection   4 mg - 4/19/2023 10:53:00 AM  ropivacaine (NAROPIN) 0.5 % injection - Injection   20 mL - 4/19/2023 10:53:00 AM      Post Assessment  Injection Assessment: negative aspiration for heme, no paresthesia on injection and incremental injection  Patient Tolerance:comfortable throughout block  Complications:no  Additional Notes  USG used to verify needle placement and medication administration

## 2023-04-20 LAB
HCT VFR BLD AUTO: 29.8 % (ref 34–46.6)
HGB BLD-MCNC: 10.1 G/DL (ref 12–15.9)

## 2023-04-20 PROCEDURE — 25010000002 CEFAZOLIN IN DEXTROSE 2-4 GM/100ML-% SOLUTION: Performed by: ORTHOPAEDIC SURGERY

## 2023-04-20 PROCEDURE — 85014 HEMATOCRIT: CPT | Performed by: ORTHOPAEDIC SURGERY

## 2023-04-20 PROCEDURE — G0378 HOSPITAL OBSERVATION PER HR: HCPCS

## 2023-04-20 PROCEDURE — 97161 PT EVAL LOW COMPLEX 20 MIN: CPT

## 2023-04-20 PROCEDURE — 85018 HEMOGLOBIN: CPT | Performed by: ORTHOPAEDIC SURGERY

## 2023-04-20 PROCEDURE — 97530 THERAPEUTIC ACTIVITIES: CPT

## 2023-04-20 PROCEDURE — 25010000002 KETOROLAC TROMETHAMINE PER 15 MG: Performed by: ORTHOPAEDIC SURGERY

## 2023-04-20 RX ORDER — MELOXICAM 15 MG/1
15 TABLET ORAL DAILY
Qty: 30 TABLET | Refills: 0 | Status: SHIPPED | OUTPATIENT
Start: 2023-04-20 | End: 2023-05-20

## 2023-04-20 RX ORDER — HYDROCODONE BITARTRATE AND ACETAMINOPHEN 7.5; 325 MG/1; MG/1
1 TABLET ORAL EVERY 6 HOURS PRN
Qty: 50 TABLET | Refills: 0 | Status: SHIPPED | OUTPATIENT
Start: 2023-04-20 | End: 2023-05-03

## 2023-04-20 RX ORDER — ASPIRIN 81 MG/1
81 TABLET ORAL EVERY 12 HOURS SCHEDULED
Qty: 59 TABLET | Refills: 0 | Status: SHIPPED | OUTPATIENT
Start: 2023-04-20 | End: 2023-05-20

## 2023-04-20 RX ORDER — TRAMADOL HYDROCHLORIDE 50 MG/1
50 TABLET ORAL EVERY 8 HOURS PRN
Qty: 40 TABLET | Refills: 0 | Status: SHIPPED | OUTPATIENT
Start: 2023-04-20 | End: 2023-05-03

## 2023-04-20 RX ADMIN — ASPIRIN 81 MG: 81 TABLET, COATED ORAL at 20:04

## 2023-04-20 RX ADMIN — ASPIRIN 81 MG: 81 TABLET, COATED ORAL at 09:33

## 2023-04-20 RX ADMIN — MELOXICAM 15 MG: 15 TABLET ORAL at 09:33

## 2023-04-20 RX ADMIN — KETOROLAC TROMETHAMINE 15 MG: 15 INJECTION, SOLUTION INTRAMUSCULAR; INTRAVENOUS at 09:33

## 2023-04-20 RX ADMIN — CEFAZOLIN SODIUM 2 G: 2 INJECTION, SOLUTION INTRAVENOUS at 06:00

## 2023-04-20 RX ADMIN — SODIUM CHLORIDE 100 ML/HR: 9 INJECTION, SOLUTION INTRAVENOUS at 02:36

## 2023-04-20 RX ADMIN — HYDROCODONE BITARTRATE AND ACETAMINOPHEN 1 TABLET: 7.5; 325 TABLET ORAL at 08:25

## 2023-04-20 NOTE — DISCHARGE INSTRUCTIONS
Dr. Wilfredo Schaffer   Total Knee Replacement Discharge Instructions:  Office Phone Number: [phone]    I. ACTIVITIES:  1. Exercises:  Complete exercise program as taught by the hospital physical therapist 2 times per day.  You may wean off the walker to a cane when directed by the physical therapist.  Exercise program will be advanced by the physical therapist  During the day be up ambulating every 2 hours (while awake) for short distances  Complete the ankle pump exercises at least 10 times per hour (while awake)  Elevate legs most of the day the first week post operatively and thereafter elevate legs when in bed and for at least 30 minutes during the day. Use cold packs 20-30 minutes approximately 5 times per day. This should be done before and after completing your exercises and at any time you are experiencing pain/ stiffness in your operative extremity.      2. Activities of Daily Living:  No tub baths, hot tubs, or swimming pools for 4 weeks  The tan or skin colored dressing is on your knee is waterproof.  You may shower without covering the dressing beginning 3 days after the operation.  After 7 days you may remove the dressing.  If the dressing becomes saturated prior to day 7, it may be changed.  After dressing removal, do not scrub or rub the incision. Allow skin glue to fall off over the next few weeks.  After the dressing is removed, simply let the water run over the incision and pat dry.    II. Restrictions  Follow any movement restrictions that was discussed with you by either Dr. Schaffer or the physical therapist.     Avoid kneeling on the knee that was operated on  Dr. Schaffer will discuss with you when you will be able to drive again at your first post-op appointment.  Weight bearing is as tolerated.  First week stay inside on even terrain. May go up and down stairs one stair at a time utilizing the hand rail.  Once you feel confident, you may venture outside.    Exercises:  Perform quad sets  as instructed by the therapist at least 3 times a day  Perform leg hangs with you heel placed on a chair or footrest and allow you knee to stretch towards a more straightened position several times a day  Work on knee flexion exercises at least three times daily.  Avoid placing a pillow under your knee as this will cause your knee to become more stiff throughout the recovery process.    III. Precautions:  Everyone that comes near you should wash their hands  No elective dental, genital-urinary, or colon procedures or surgical procedures for 12 weeks after surgery unless absolutely necessary.   If dental work or surgical procedure is deemed absolutely necessary within 12 weeks of surgery, you will need to contact Dr. Schaffer's office as you will need to take antibiotics 1 hour prior to any dental work (including teeth cleanings).  Dr. Schaffer will prescribe prophylactic antibiotics for all dental procedures for one year  as a precautionary measure to minimize risk of infection.  If you are a diabetic or take immunosuppressive medication, you may have to take prophylactic antibiotics the remainder of your life before dental work.    Avoid sick people. If you must be around someone who is ill, they should wear a mask.  Avoid visits to the Emergency Room or Urgent Care unless you are having a life threatening event.   If you have leg swelling you may wear leg compression stocking.   Stockings are to be placed on in the morning and removed at night. Monitor the stockings to ensure that any swelling is not causing the stockings to become too tight. In this case, remove stockings immediately.    IV. INCISION CARE:  Dr. Schaffer takes great care in closing your incision to give you the best opportunity for a healthy incision with minimal scarring. He places sutures below the skin surface that will eventually dissolve.  The incision is then covered with a skin glue which makes the incision water tight, and minimizes bleeding  onto the dressing.  No staples are used.  Occasionally one of the buried stitches may come to the skin surface and may need to be removed.  Please resist the temptation of removing the stitch by yourself.   will be happy to remove it for you.  Bruising around the incision and thigh is normal and to be expected.  Please keep dressing in place at least until post-op day 7. You may remove and replace dressing before day 7 if the dressing begins to fall off or becomes saturated. Wash your hands and under your finger nails prior to dressing changes.  After day 7 as long as incision is dry and intact, you may leave the dressing off and open to air.    If dressing must be changed, utilize dry gauze and paper tape. Avoid touching the side of the gauze that goes against the incision with your hands.  No creams or ointments to the incision until permission given by Dr. Schaffer.  Do not touch or pick at the incision, or try to remove any sutures or skin glue.  Check dressing every day and notify surgeon immediately if any of the following signs or symptoms are noted:  Increase in redness  Increase in swelling of the entire extremity that does not go away with elevation.  Notify office that you may have a blood clot.    Drainage oozing from the incision  Pulling apart of the edges of the incision  Increase in overall body temperature (greater than 100.5 degrees)    V. Medications:   1. Anticoagulants: You will be discharged on an anticoagulant. This is a prophylactic medication that helps prevent blood clots during your post-operative period. The type and length of dosage varies based on your individual needs, procedure performed, and Dr. Schaffer's preference.  While taking the anticoagulant, you should avoid taking any additional aspirin than what is prescribed.   Notify surgeon immediately if any tamika bleeding is noted in the urine, stool, emesis, or from the nose or the incision. Blood in the stool will often appear  as black rather than red. Blood in urine may appear as pink. Blood in emesis may appear as brown/black like coffee grounds.  You will need to apply pressure for longer periods of time to any cuts or abrasions to stop bleeding  Avoid alcohol while taking anticoagulants.    2. Stool Softeners: You will be at greater risk of constipation after surgery due to being less mobile and the pain medications.   Take stool softeners as instructed by your surgeon while on pain medications. Over the counter Colace 100 mg 1-2 capsules twice daily.   If stools become too loose or too frequent, please decreases the dosage or stop the stool softener.  If constipation occurs despite use of stool softeners, you are to continue the stool softeners and add a laxative (Milk of Magnesia 1 ounce daily as needed).  If no bowel movement occurs past 3 days, then purchase Magnesium citrate and drink 1/2 bottle every 8 hours (on ice tastes better) until success. If no bowel movement by post-operative day 5 please call Dr. Schaffer office for further instructions.   You may need to decrease or stop your pain medications if bowel movements to not occur.     Drink plenty of fluids, and eat fruits and vegetables during your recovery time.    3. Pain Medications utilized after surgery are narcotics and the law requires that the following information be given to all patients that are prescribed narcotics:  CLASSIFICATION: Pain medications are called Opioids and are narcotics  LEGALITIES: It is illegal to share narcotics with others and to drive within 24 hours of taking narcotics  POTENTIAL SIDE EFFECTS: Potential side effects of opioids include: nausea, vomiting, itching, dizziness, drowsiness, dry mouth, constipation, and difficulty urinating.  POTENTIAL ADVERSE EFFECTS:   Opioid tolerance can develop with use of pain medications and this simply means that it requires more and more of the medication to control pain; however, this is seen more in  "patients that use opioids for longer periods of time.  Opioid dependence can develop with use of Opioids and this simply means that to stop the medication can cause withdrawal symptoms; however, this is seen with patients that use Opioids for longer periods of time.  Opioid addiction can develop with use of Opioids and the incidence of this is very unlikely in patients who take the medications as ordered and stop the medications as instructed.  Opioid overdose can be dangerous, but is unlikely when the medication is taken as ordered and stopped when ordered. It is important not to mix opioids with alcohol or with and type of sedative such as Benadryl as this can lead to over sedation and respiratory difficulty.  DOSAGE:   Pain medications will need to be taken consistently for the first few days to decrease pain and promote adequate pain relief and participation in physical therapy.  After the initial surgical pain begins to resolve, you may begin to decrease the pain medication. By the end of 6 weeks, you should be off of pain medications except for before physical therapy or to help with pain when attempting to fall asleep.  Pain medications will be tapered to lesser dosages as you are further from your surgical day.  No pain medications will be provided after 3 months from surgery.     Refills will not be given by the office during evening hours, or weekends.  To seek refills on pain medications during the post-operative period, you must call the office 48 hours in advance to request the refill. The office will then notify you when to  the prescription. DO NOT wait until you are out of the medication to request a refill.  They can not be \"called in\" to the pharmacy.      How to Wean Off Pain Medication:   As you begin to feel better, gradually wean off the narcotic pain medication and begin to use it only for breakthrough pain.  Gradually reduce the total number of pills you take each day.  This can be " done by taking fewer pills at a time or by increasing the amount of time between each pill.    For example, if you were taking 2 pills every 6 hours you would be taking a total of 8 pills per day.  Reduce this to 6 pills per day, then 4-5 and so on.  This can be done by taking 1 pill at a time instead of 2, or by taking the pills every 8 hours instead of every 6.    As you begin to wean from the narcotic pain medication, begin substituting with over the counter tylenol when you are not taking the narcotic.  Limit total tylenol dosage to less than 4 grams per day.    V. FOLLOW-UP VISITS:  You will need to follow up in the office with Dr. Schaffer at 3 weeks.  Please call [phone] if you need to confirm or reschedule your appointment time.   If you have any concerns or suspected complications prior to your follow up visit, please call your surgeons office. Do not wait until your appointment time if you suspect complications. These will need to be addressed in the office promptly.

## 2023-04-20 NOTE — PROGRESS NOTES
Procedure(s):  CONVERSION LEFT PARTIAL KNEE REPLACEMENT TO TOTAL KNEE REPLACEMENT     LOS: 0 days     Subjective :   Patient seen and examined.  Resting comfortably.  Alert, responding appropriately to questions.  Denies any new problems overnight.      Objective :    Vital signs in last 24 hours:  Vitals:    04/20/23 0241 04/20/23 0424 04/20/23 0551 04/20/23 0600   BP: 121/73  128/75    BP Location: Left arm  Left arm    Patient Position: Lying  Lying    Pulse: 54 54 62 62   Resp: 16  16    Temp: 97.1 °F (36.2 °C)  98 °F (36.7 °C)    TempSrc: Oral  Oral    SpO2: 97% 95% 98% 98%   Weight:       Height:           PHYSICAL EXAM:  Patient is calm, in no acute distress, awake and oriented x 3.  Dressing is clean, dry and intact.  No signs of infection.  Swelling is appropriate in amount.  Ecchymosis is appropriate in amount.  EHL, FHL, TA, GS intact.  Patient is neurovascularly intact distally.        Intake/Output                 04/19/23 0701 - 04/20/23 0700     4417-2301 5434-7888 Total              Intake    P.O.  --  680 680    I.V.  2200  400 2600    Total Intake 2200 1080 3280       Output    Urine  700  1000 1700    Emesis/NG output  --  100 100    Drains  --  200 200    Blood  100  -- 100    Total Output 800 1300 2100            LABS:  AM LABS PENDING    DIAGNOSTICS:  PATIENT ID #:   [id number]  ORDER #:   [id number]  EXAM:   XR KNEE 1 OR 2 VW LEFT  [address] [place] [address]  [phone]  XR KNEE 1 OR 2 VW LEFT- POSTOP PORTABLE 2 VIEWS LEFT KNEE  CLINICAL INFORMATION: Post arthroplasty  FINDINGS: Prosthesis is satisfactory in position. A complicating process  is not identified.  This report was finalized on 4/19/2023 3:29 PM by Dr. Zen Ma M.D.  Signed by: Zen Ma MD on 4/19/2023 3:29 PM      ASSESSMENT:  Status post Procedure(s):  CONVERSION LEFT PARTIAL KNEE REPLACEMENT TO TOTAL KNEE REPLACEMENT      Plan:  Follow for AM labs    Continue Physical  Therapy, increase mobility and range of motion as tolerated.  WBAT to the operative extremity  Continue SCDs & DVT prophylaxis    200 cc output from Hemovac drain over the last 24 hours.  We will monitor output over the next 8 hours and plan to remove today prior to discharge    Aspirin 81 mg BID for VTE chemoprophylaxis    Dispo planning for home with home health and family assistance today if labs stable and continues to progress well with PT      Monty Fay, APRN    Date: 4/20/2023  Time: 06:43 EDT

## 2023-04-20 NOTE — PLAN OF CARE
Goal Outcome Evaluation:  Plan of Care Reviewed With: patient    Pt is 56 y/o F admitted to [facility] on 4/19/23 for L partial conversion to TKA, she is POD1. At baseline pt is indep with use of rwx, lives home with familiy, 6 FORREST. Pt currently demos SBA for transfers and ambulation up to 120' with antalgic pattern. CGA to SBA for 4 step negotiation, good carryover with verbal cues. She will benefit from skilled PT to address balance, endurance, and strength deficits. Pt plans to return home later today with family assist and home PT.

## 2023-04-20 NOTE — CASE MANAGEMENT/SOCIAL WORK
Continued Stay Note  [facility]     Patient Name: Jennifer Samayoa  MRN: [id number]  Today's Date: 4/20/2023    Admit Date: 4/19/2023        Discharge Plan     Row Name 04/20/23 1106       Plan    Plan Comments Per care plan, plan is home with [facility]. Referral placed in [facility]. They have accepted and will follow at [place].               Discharge Codes    No documentation.               Expected Discharge Date and Time     Expected Discharge Date Expected Discharge Time    Apr 20, 2023             Joaquina Barnett RN

## 2023-04-20 NOTE — PLAN OF CARE
Goal Outcome Evaluation:    Patient alert and oriented, VSS, on room air. She is POD1, ambulating with a walker and gait belt. Voiding appropriately. NS going continuously at 100 cc. Received two doses of Ancef. Plans to discharge later this morning.

## 2023-04-20 NOTE — THERAPY EVALUATION
Patient Name: Jennifer Samayoa  : 1965    MRN: [id number]                              Today's Date: 2023       Admit Date: 2023    Visit Dx:     ICD-10-CM ICD-9-CM   1. Failure of arthroplasty, initial encounter  T84.019A 996.47     V43.60   2. Primary osteoarthritis of knee  M17.10 715.16     Patient Active Problem List   Diagnosis   • Asthma   • Primary osteoarthritis of right knee   • Atopic rhinitis   • Pain due to total left knee replacement   • Failed arthroplasty     Past Medical History:   Diagnosis Date   • Allergic Seasonal   • Asthma    • Atopic rhinitis    • Osteoarthritis of knee      Past Surgical History:   Procedure Laterality Date   • ADENOIDECTOMY     •  SECTION  2002   • COLONOSCOPY     • HAND SURGERY  Both hands 6 years ago   • JOINT REPLACEMENT      partial left knee   • KNEE ARTHROPLASTY, PARTIAL REPLACEMENT      left knee   • MULTIPLE TOOTH EXTRACTIONS     • TONSILLECTOMY        General Information     Row Name 23 0907          Physical Therapy Time and Intention    Document Type evaluation  -ST     Mode of Treatment individual therapy;physical therapy  -     Row Name 23 09          General Information    Patient Profile Reviewed yes  -ST     Prior Level of Function independent:  -ST     Existing Precautions/Restrictions fall  -ST     Barriers to Rehab none identified  -     Row Name 23 09          Living Environment    People in Home spouse;child(jun), dependent  -     Row Name 23 09          Home Main Entrance    Number of Stairs, Main Entrance six  -ST     Stair Railings, Main Entrance railings safe and in good condition  -     Row Name 23 0907          Stairs Within Home, Primary    Number of Stairs, Within Home, Primary none  -     Row Name 23 09          Cognition    Orientation Status (Cognition) oriented x 4  -     Row Name 23 09          Safety Issues, Functional Mobility     Impairments Affecting Function (Mobility) balance;pain;endurance/activity tolerance;strength  -ST     Comment, Safety Issues/Impairments (Mobility) gait belt, nonskid socks  -ST           User Key  (r) = Recorded By, (t) = Taken By, (c) = Cosigned By    Initials Name Provider Type    Dalia Leos PT Physical Therapist               Mobility     Row Name 04/20/23 0908          Bed Mobility    Comment, (Bed Mobility) UIC  -ST     Row Name 04/20/23 0908          Sit-Stand Transfer    Sit-Stand [place] (Transfers) standby assist;verbal cues  -ST     Assistive Device (Sit-Stand Transfers) walker, front-wheeled  -ST     Row Name 04/20/23 0908          Gait/Stairs (Locomotion)    [place] Level (Gait) standby assist;supervision;verbal cues  -ST     Assistive Device (Gait) walker, front-wheeled  -ST     Distance in Feet (Gait) 50'. 120'  -ST     Deviations/Abnormal Patterns (Gait) left sided deviations;bilateral deviations;antalgic;titi decreased;gait speed decreased;stride length decreased  -ST     Bilateral Gait Deviations forward flexed posture;heel strike decreased  -ST     Left Sided Gait Deviations weight shift ability decreased  -ST     [place] Level (Stairs) stand by assist;verbal cues;contact guard  -ST     Handrail Location (Stairs) right side (ascending);left side (descending)  -ST     Number of Steps (Stairs) 4  -ST     Ascending Technique (Stairs) step-to-step  -ST     Descending Technique (Stairs) step-to-step  -ST     Stairs, Safety Issues balance decreased during turns  -ST     Comment, (Gait/Stairs) VC for walker use initially and sequencing for descending steps, good carryover noted  -ST           User Key  (r) = Recorded By, (t) = Taken By, (c) = Cosigned By    Initials Name Provider Type    Dalia Leos PT Physical Therapist               Obj/Interventions     Row Name 04/20/23 0911          Range of Motion Comprehensive    Comment, General Range of Motion L  surgical leg impaired  -ST     Row Name 04/20/23 0911          Strength Comprehensive (MMT)    Comment, General Manual Muscle Testing (MMT) Assessment L surgical leg impaired  -[facility] Name 04/20/23 0911          Motor Skills    Therapeutic Exercise other (see comments)  TKA protocol x 5  -[facility] Name 04/20/23 0911          Balance    Comment, Balance SBA for dynamic balance, no LOB no buckling noted. progresses to supervision with duration of session  -[facility] Name 04/20/23 0911          Sensory Assessment (Somatosensory)    Sensory Assessment (Somatosensory) sensation intact  -ST           User Key  (r) = Recorded By, (t) = Taken By, (c) = Cosigned By    Initials Name Provider Type    ST Dalia Acevedo, PT Physical Therapist               Goals/Plan    No documentation.                Clinical Impression     [facility] Name 04/20/23 0912          Pain    Pain Location - Side/Orientation Left  -ST     Pain Location - knee  -ST     Pre/Posttreatment Pain Comment does not rate L knee pain, reports sore but better than before surgery  -     Pain Intervention(s) Repositioned;Cold applied;Ambulation/increased activity  -[facility] Name 04/20/23 0912          Plan of Care Review    Plan of Care Reviewed With patient  -ST     Outcome Evaluation Pt is 58 y/o F admitted to [facility] on 4/19/23 for L partial conversion to TKA, she is POD1. At baseline pt is indep with use of rwx, lives home with familiy, 6 FORREST. Pt currently demos SBA for transfers and ambulation up to 120' with antalgic pattern. CGA to SBA for 4 step negotiation, good carryover with verbal cues. She will benefit from skilled PT to address balance, endurance, and strength deficits. Pt plans to return home later today with family assist and home PT.  -     Row Name 04/20/23 0912          Therapy Assessment/Plan (PT)    Rehab Potential (PT) good, to achieve stated therapy goals  -     Criteria for Skilled Interventions Met (PT) yes  -ST     Therapy  Frequency (PT) daily  -ST     Predicted Duration of Therapy Intervention (PT) 1 week  -ST     Row Name 04/20/23 0912          Positioning and Restraints    Pre-Treatment Position sitting in chair/recliner  -ST     Post Treatment Position chair  -ST     In Chair reclined;call light within reach;encouraged to call for assist;exit alarm on  -ST           User Key  (r) = Recorded By, (t) = Taken By, (c) = Cosigned By    Initials Name Provider Type    Dalia Leos PT Physical Therapist               Outcome Measures     Row Name 04/20/23 0913          How much help from another person do you currently need...    Turning from your back to your side while in flat bed without using bedrails? 4  -ST     Moving from lying on back to sitting on the side of a flat bed without bedrails? 4  -ST     Moving to and from a bed to a chair (including a wheelchair)? 3  -ST     Standing up from a chair using your arms (e.g., wheelchair, bedside chair)? 4  -ST     Climbing 3-5 steps with a railing? 3  -ST     To walk in hospital room? 3  -ST     AM-PAC 6 Clicks Score (PT) 21  -ST     Highest level of mobility 6 --> Walked 10 steps or more  -ST     Row Name 04/20/23 0913          Functional Assessment    Outcome Measure Options AM-PAC 6 Clicks Basic Mobility (PT)  -ST           User Key  (r) = Recorded By, (t) = Taken By, (c) = Cosigned By    Initials Name Provider Type    Dalia Leos PT Physical Therapist                               PT Recommendation and Plan     Plan of Care Reviewed With: patient  Outcome Evaluation: Pt is 58 y/o F admitted to [facility] on 4/19/23 for L partial conversion to TKA, she is POD1. At baseline pt is indep with use of rwx, lives home with familiy, 6 FORREST. Pt currently demos SBA for transfers and ambulation up to 120' with antalgic pattern. CGA to SBA for 4 step negotiation, good carryover with verbal cues. She will benefit from skilled PT to address balance, endurance, and strength deficits.  Pt plans to return home later today with family assist and home PT.     Time Calculation:    PT Charges     Row Name 04/20/23 0916             Time Calculation    Start Time 0817  -ST      Stop Time 0843  -ST      Time Calculation (min) 26 min  -ST      PT Received On 04/20/23  -ST      PT - Next Appointment 04/21/23  -ST         Time Calculation- PT    Total Timed Code Minutes- PT 16 minute(s)  -ST         Timed Charges    61579 - Gait Training Minutes  6  -ST      86173 - PT Therapeutic Activity Minutes 10  -ST         Total Minutes    Timed Charges Total Minutes 16  -ST       Total Minutes 16  -ST            User Key  (r) = Recorded By, (t) = Taken By, (c) = Cosigned By    Initials Name Provider Type    ST Dalia Acevedo, PT Physical Therapist              Therapy Charges for Today     Code Description Service Date Service Provider Modifiers Qty    96265948155 HC PT THERAPEUTIC ACT EA 15 MIN 4/20/2023 Dalia Acevedo, PT GP 1    85220916817 HC PT EVAL LOW COMPLEXITY 2 4/20/2023 Dalia Acevedo, PT GP 1          PT G-Codes  Outcome Measure Options: AM-PAC 6 Clicks Basic Mobility (PT)  AM-PAC 6 Clicks Score (PT): 21  PT Discharge Summary  Anticipated Discharge Disposition (PT): home with assist, home with home health    Dalia Acevedo PT  4/20/2023

## 2023-04-20 NOTE — CASE MANAGEMENT/SOCIAL WORK
Case Management Discharge Note      Final Note: dc home with [facility].         Selected Continued Care - Admitted Since 4/19/2023     Destination    No services have been selected for the patient.              Durable Medical Equipment    No services have been selected for the patient.              Dialysis/Infusion    No services have been selected for the patient.              Home Medical Care Coordination complete.    Service Provider Selected Services Address Phone Fax Patient Preferred    [facility] [address] [place] [address] [phone] --          Therapy    No services have been selected for the patient.              Community Resources    No services have been selected for the patient.              Community & DME    No services have been selected for the patient.                  Transportation Services  Private: Car    Final Discharge Disposition Code: 01 - home or self-care

## 2023-04-21 ENCOUNTER — READMISSION MANAGEMENT (OUTPATIENT)
Dept: CALL CENTER | Facility: HOSPITAL | Age: 58
End: 2023-04-21
Payer: COMMERCIAL

## 2023-04-21 VITALS
TEMPERATURE: 98.6 F | OXYGEN SATURATION: 95 % | WEIGHT: 180 LBS | DIASTOLIC BLOOD PRESSURE: 68 MMHG | RESPIRATION RATE: 16 BRPM | HEIGHT: 63 IN | HEART RATE: 66 BPM | SYSTOLIC BLOOD PRESSURE: 134 MMHG | BODY MASS INDEX: 31.89 KG/M2

## 2023-04-21 LAB
HCT VFR BLD AUTO: 31.2 % (ref 34–46.6)
HGB BLD-MCNC: 10.4 G/DL (ref 12–15.9)

## 2023-04-21 PROCEDURE — G0378 HOSPITAL OBSERVATION PER HR: HCPCS

## 2023-04-21 PROCEDURE — 97110 THERAPEUTIC EXERCISES: CPT

## 2023-04-21 PROCEDURE — 85014 HEMATOCRIT: CPT | Performed by: ORTHOPAEDIC SURGERY

## 2023-04-21 PROCEDURE — 97530 THERAPEUTIC ACTIVITIES: CPT

## 2023-04-21 PROCEDURE — 85018 HEMOGLOBIN: CPT | Performed by: ORTHOPAEDIC SURGERY

## 2023-04-21 RX ADMIN — HYDROCODONE BITARTRATE AND ACETAMINOPHEN 1 TABLET: 7.5; 325 TABLET ORAL at 08:24

## 2023-04-21 RX ADMIN — MELOXICAM 15 MG: 15 TABLET ORAL at 08:24

## 2023-04-21 RX ADMIN — ASPIRIN 81 MG: 81 TABLET, COATED ORAL at 08:24

## 2023-04-21 RX ADMIN — HYDROCODONE BITARTRATE AND ACETAMINOPHEN 1 TABLET: 7.5; 325 TABLET ORAL at 00:22

## 2023-04-21 NOTE — DISCHARGE SUMMARY
Discharge Summary    Date of Admission: 4/19/2023  8:59 AM  Date of Discharge:  4/21/2023    Discharge Diagnosis:   Failed partial knee arthroplasty    PMHX:   Past Medical History:   Diagnosis Date   • Allergic Seasonal   • Asthma    • Atopic rhinitis    • Osteoarthritis of knee        Discharge Disposition  [place] [facility]    Procedures Performed  Procedure(s):  CONVERSION LEFT PARTIAL KNEE REPLACEMENT TO TOTAL KNEE REPLACEMENT       Indication for Admission  Patient is a 57 y.o. female admitted after undergoing the above surgical procedure. They were admitted for post-operative pain control, medical management and physical therapy.  They progressed with physical therapy.   They were deemed stable for discharge.      Consults:   Consults     No orders found from 3/21/2023 to 4/20/2023.          Discharge Instructions:  Patient is weight bearing as tolerated on the operative leg.  Patient is to progress range of motion and ambulation as tolerated.  Use walker as needed for stability and gait.  May progress to cane as tolerated.  The dressing should be left on knee until post-operative day 7, and then removed.  Dressing is waterproof. Patient may shower 3 days after the operation with the dressing in place. Replace the dressing if it becomes wet or saturated. Use compression stockings for leg swelling.  Patient will follow-up in the office in 3 weeks.  Call the office at [phone] for any questions or concerns.      Discharge Medications     Discharge Medications      New Medications      Instructions Start Date   Aspirin Low Dose 81 MG EC tablet  Generic drug: aspirin   Take 1 tablet by mouth Every 12 (Twelve) Hours for 59 doses.      HYDROcodone-acetaminophen 7.5-325 MG per tablet  Commonly known as: NORCO   Take 1 tablet by mouth Every 6 (Six) Hours As Needed for Moderate Pain.      meloxicam 15 MG tablet  Commonly known as: MOBIC   Take 1 tablet by mouth Daily.      traMADol 50 MG tablet  Commonly  known as: ULTRAM   Take 1 tablet by mouth Every 8 (Eight) Hours As Needed for Moderate Pain.         Continue These Medications      Instructions Start Date   albuterol sulfate  (90 Base) MCG/ACT inhaler  Commonly known as: PROVENTIL HFA;VENTOLIN HFA;PROAIR HFA   2 puffs, Inhalation, Every 4 Hours PRN         Stop These Medications    Chlorhexidine Gluconate Cloth 2 % pads            Discharge Diet: Regular diet    Activity at Discharge: Weight bearing as tolerated, activity as tolerated    Follow-up Appointments  No future appointments.           04/21/23,  08:24 EDT    Wilfredo Schaffer MD  Orthopaedic Surgeon    [facility]  [phone]

## 2023-04-21 NOTE — DISCHARGE SUMMARY
Discharge Summary    Date of Admission: 4/19/2023  8:59 AM  Date of Discharge:  4/21/2023    Discharge Diagnosis:   Failed arthroplasty [T84.019A]    PMHX:   Past Medical History:   Diagnosis Date   • Allergic Seasonal   • Asthma    • Atopic rhinitis    • Osteoarthritis of knee        Discharge Disposition  [place]-Health [place] [facility] [place]    Procedures Performed  Procedure(s):  CONVERSION LEFT PARTIAL KNEE REPLACEMENT TO TOTAL KNEE REPLACEMENT       Indication for Admission  Patient is a 57 y.o. female with a history of a left unicompartmental arthroplasty performed by another physician.  Patient was found to have pain and instability in the knee.  Radiographs demonstrated failure of her left partial knee replacement.  The patient had failed all conservative measures pre-operatively and found to be a candidate for Total knee arthroplasty. Risks, benefits, outcomes of the procedure discussed and the patient wished to proceed. Obtained pre-operative medical clearance and found to be medically stable to undergo the above procedure. Patient tolerated the procedure well and was admitted after undergoing the above surgical procedure. They were admitted for post-operative pain control, medical management and physical therapy. The patient was started on antibiotics post-operatively per SCIP protocol and DVT prophylaxis started immediately. Patient was ambulatory on POD #0. Post-operative laboratory studies and vital signs remained stable. Patient was evaluated by Physical Therapy and found to be medically stable for discharge.    Consults:   Consults     No orders found from 3/21/2023 to 4/20/2023.          Discharge Instructions:  Patient is weight bearing as tolerated on the operative leg.  Patient is to progress range of motion and ambulation as tolerated.  Use walker as needed for stability and gait.  May progress to cane as tolerated.  The dressing should be left on knee until post-operative day 7, and then removed. Then  daily dry dressing changes with a non-adherent island dressing (I.e. Telfa pad). Dressing is waterproof. Patient may shower.  Use ace wrap as needed for swelling.  Patient will follow-up in the office as directed by the operative surgeon.  Call the office at [phone] for any questions or concerns.      Discharge Medications: Per the discharge medication reconciliation list.    Discharge Diet: Patient will continue with their regular home diet.    Activity at Discharge: Weight bearing as tolerated. Walk with the use of a walker and progress with physical therapy. Focus on aggressive range of motion.    Follow-up Appointments: Follow-up as directed by your operative surgeon.     Ori العراقي III, PA-C  04/21/23,  07:42 EDT

## 2023-04-21 NOTE — PLAN OF CARE
Goal Outcome Evaluation:  Plan of Care Reviewed With: patient        Progress: improving  Outcome Evaluation: pt with improving dynamic balance, progressing to supervision level with all activity. Improving L kneeflexion noted with activity and exercise. Ambulation distance limited by increased soreness this AM, but improving step through pattern today. She continues to benefit from skilled PT to address endurance, balance, and strength. Pt plans to return home later today with home PT.

## 2023-04-21 NOTE — PLAN OF CARE
Goal Outcome Evaluation:  Plan of Care Reviewed With: patient        Progress: improving  Outcome Evaluation: VSS, RA, SL, up with assist of 1, voiding per BRP, no IV site, HV in place, educated on comorbidities, home hopefully today

## 2023-04-21 NOTE — THERAPY TREATMENT NOTE
Patient Name: Jennifer Samayoa  : 1965    MRN: [id number]                              Today's Date: 2023       Admit Date: 2023    Visit Dx:     ICD-10-CM ICD-9-CM   1. Failure of arthroplasty, initial encounter  T84.019A 996.47     V43.60   2. Primary osteoarthritis of knee  M17.10 715.16     Patient Active Problem List   Diagnosis   • Asthma   • Primary osteoarthritis of right knee   • Atopic rhinitis   • Pain due to total left knee replacement   • Failed arthroplasty     Past Medical History:   Diagnosis Date   • Allergic Seasonal   • Asthma    • Atopic rhinitis    • Osteoarthritis of knee      Past Surgical History:   Procedure Laterality Date   • ADENOIDECTOMY     •  SECTION  2002   • COLONOSCOPY     • HAND SURGERY  Both hands 6 years ago   • JOINT REPLACEMENT      partial left knee   • KNEE ARTHROPLASTY, PARTIAL REPLACEMENT      left knee   • MULTIPLE TOOTH EXTRACTIONS     • TONSILLECTOMY        General Information     Row Name 23 0827          Physical Therapy Time and Intention    Document Type therapy note (daily note)  -ST     Mode of Treatment individual therapy;physical therapy  -     Row Name 23 0827          General Information    Patient Profile Reviewed yes  -ST     Existing Precautions/Restrictions fall  -ST     Row Name 23 0827          Cognition    Orientation Status (Cognition) oriented x 4  -ST     Row Name 23 0827          Safety Issues, Functional Mobility    Impairments Affecting Function (Mobility) balance;pain;endurance/activity tolerance;strength  -ST     Comment, Safety Issues/Impairments (Mobility) gait belt, nonskidsocks  -ST           User Key  (r) = Recorded By, (t) = Taken By, (c) = Cosigned By    Initials Name Provider Type    ST Dalia Acevedo PT Physical Therapist               Mobility     Row Name 23 0827          Sit-Stand Transfer    Sit-Stand [place] (Transfers) standby assist;verbal cues   -ST     Assistive Device (Sit-Stand Transfers) walker, front-wheeled  -ST     Row Name 04/21/23 0827          Gait/Stairs (Locomotion)    [place] Level (Gait) supervision;verbal cues  -ST     Assistive Device (Gait) walker, front-wheeled  -ST     Distance in Feet (Gait) 65'  -ST     Deviations/Abnormal Patterns (Gait) left sided deviations;bilateral deviations;antalgic;titi decreased;gait speed decreased;stride length decreased  -ST     Bilateral Gait Deviations forward flexed posture;heel strike decreased  -ST     Left Sided Gait Deviations weight shift ability decreased  -ST     Comment, (Gait/Stairs) ambulation distance limited by increased soreness this date  -ST           User Key  (r) = Recorded By, (t) = Taken By, (c) = Cosigned By    Initials Name Provider Type    Dalia Leos PT Physical Therapist               Obj/Interventions     Row Name 04/21/23 0827          Motor Skills    Therapeutic Exercise other (see comments)  TKA protocol 2 x10  -ST     Row Name 04/21/23 0827          Balance    Comment, Balance progressed to supervision for dynamic balance, no LOB  -ST           User Key  (r) = Recorded By, (t) = Taken By, (c) = Cosigned By    Initials Name Provider Type    Dalia Leos, PT Physical Therapist               Goals/Plan    No documentation.                Clinical Impression     Row Name 04/21/23 0828          Pain    Pretreatment Pain Rating 5/10  -ST     Posttreatment Pain Rating 5/10  -ST     Pain Location - Side/Orientation Left  -ST     Pain Location - knee  -ST     Pain Intervention(s) Cold applied;Repositioned;Ambulation/increased activity  -ST     Row Name 04/21/23 0828          Plan of Care Review    Plan of Care Reviewed With patient  -ST     Progress improving  -ST     Outcome Evaluation pt with improving dynamic balance, progressing to supervision level with all activity. Improving L kneeflexion noted with activity and exercise. Ambulation distance limited  by increased soreness this AM, but improving step through pattern today. She continues to benefit from skilled PT to address endurance, balance, and strength. Pt plans to return home later today with home PT.  -     Row Name 04/21/23 0828          Therapy Assessment/Plan (PT)    Rehab Potential (PT) good, to achieve stated therapy goals  -ST     Criteria for Skilled Interventions Met (PT) yes  -ST     Therapy Frequency (PT) daily  -     Row Name 04/21/23 0828          Positioning and Restraints    Pre-Treatment Position sitting in chair/recliner  -ST     Post Treatment Position chair  -ST     In Chair reclined;call light within reach;encouraged to call for assist  -ST           User Key  (r) = Recorded By, (t) = Taken By, (c) = Cosigned By    Initials Name Provider Type    Dalia Leos PT Physical Therapist               Outcome Measures     Row Name 04/21/23 0831          How much help from another person do you currently need...    Turning from your back to your side while in flat bed without using bedrails? 4  -ST     Moving from lying on back to sitting on the side of a flat bed without bedrails? 4  -ST     Moving to and from a bed to a chair (including a wheelchair)? 3  -ST     Standing up from a chair using your arms (e.g., wheelchair, bedside chair)? 4  -ST     Climbing 3-5 steps with a railing? 3  -ST     To walk in hospital room? 4  -ST     AM-PAC 6 Clicks Score (PT) 22  -ST     Highest level of mobility 7 --> Walked 25 feet or more  -     Row Name 04/21/23 0831          Functional Assessment    Outcome Measure Options AM-PAC 6 Clicks Basic Mobility (PT)  -ST           User Key  (r) = Recorded By, (t) = Taken By, (c) = Cosigned By    Initials Name Provider Type    Dalia Leos PT Physical Therapist                             Physical Therapy Education     Title: PT OT SLP Therapies (Done)     Topic: Physical Therapy (Done)     Point: Mobility training (Done)     Learning  Progress Summary           Patient Acceptance, E,TB, VU by  at 4/21/2023 0831                   Point: Home exercise program (Done)     Learning Progress Summary           Patient Acceptance, E,TB, VU by  at 4/21/2023 0831                   Point: Body mechanics (Done)     Learning Progress Summary           Patient Acceptance, E,TB, VU by  at 4/21/2023 0831                   Point: Precautions (Done)     Learning Progress Summary           Patient Acceptance, E,TB, VU by  at 4/21/2023 0831                               User Key     Initials Effective Dates Name Provider Type Discipline    ST 09/22/22 -  Dalia Acevedo, PT Physical Therapist PT              PT Recommendation and Plan     Plan of Care Reviewed With: patient  Progress: improving  Outcome Evaluation: pt with improving dynamic balance, progressing to supervision level with all activity. Improving L kneeflexion noted with activity and exercise. Ambulation distance limited by increased soreness this AM, but improving step through pattern today. She continues to benefit from skilled PT to address endurance, balance, and strength. Pt plans to return home later today with home PT.     Time Calculation:    PT Charges     Row Name 04/21/23 0832             Time Calculation    Start Time 0803  -ST      Stop Time 0827  -ST      Time Calculation (min) 24 min  -ST      PT Received On 04/21/23  -ST      PT - Next Appointment 04/22/23  -ST         Time Calculation- PT    Total Timed Code Minutes- PT 24 minute(s)  -ST         Timed Charges    62614 - PT Therapeutic Exercise Minutes 8  -ST      50539 - Gait Training Minutes  5  -ST      95724 - PT Therapeutic Activity Minutes 11  -ST         Total Minutes    Timed Charges Total Minutes 24  -ST       Total Minutes 24  -ST            User Key  (r) = Recorded By, (t) = Taken By, (c) = Cosigned By    Initials Name Provider Type    ST Dalia Acevedo, PT Physical Therapist              Therapy Charges for  Today     Code Description Service Date Service Provider Modifiers Qty    27873531790 HC PT THERAPEUTIC ACT EA 15 MIN 4/20/2023 Dalia Acevedo, PT GP 1    08173877978 HC PT EVAL LOW COMPLEXITY 2 4/20/2023 Dalia Acevedo, PT GP 1    28575069333 HC PT THER PROC EA 15 MIN 4/21/2023 Dalia Acevedo, PT GP 1    27233753108 HC PT THERAPEUTIC ACT EA 15 MIN 4/21/2023 Dalia Acevedo, PT GP 1          PT G-Codes  Outcome Measure Options: AM-PAC 6 Clicks Basic Mobility (PT)  AM-PAC 6 Clicks Score (PT): 22  PT Discharge Summary  Anticipated Discharge Disposition (PT): home with assist, home with home health    Dalia Acevedo, PT  4/21/2023

## 2023-04-21 NOTE — PROGRESS NOTES
Procedure(s):  CONVERSION LEFT PARTIAL KNEE REPLACEMENT TO TOTAL KNEE REPLACEMENT     LOS: 0 days     Subjective :   Patient seen and examined.  Resting comfortably.  Alert, responding appropriately to questions.  Denies any new problems overnight.      Objective :    Vital signs in last 24 hours:  Vitals:    04/20/23 1405 04/20/23 1801 04/20/23 2110 04/21/23 0523   BP: 119/61 122/70 129/72 134/68   BP Location: Right arm Right arm Left arm Left arm   Patient Position: Lying Lying Lying Lying   Pulse: 81 70 68 66   Resp: 18 18 16 16   Temp: 98.1 °F (36.7 °C) 98.1 °F (36.7 °C) 98.3 °F (36.8 °C) 98.6 °F (37 °C)   TempSrc: Oral Oral Oral Oral   SpO2: 91% 98% 97% 95%   Weight:       Height:           PHYSICAL EXAM:  Patient is calm, in no acute distress, awake and oriented x 3.  Dressing is clean, dry and intact.  No signs of infection.  Swelling is appropriate in amount.  Ecchymosis is appropriate in amount.  EHL, FHL, TA, GS intact.  Patient is neurovascularly intact distally.        Intake/Output                       04/19/23 0701 - 04/20/23 0700 04/20/23 0701 - 04/21/23 0700     [id number] 9203-5985 Total 6832-2257 9657-0537 Total                 Intake    P.O.  --  800 800  1680  170 1850    I.V.  2200  400 2600  --  -- --    Total Intake 2200 1200 3400 6478 765 8567       Output    Urine  700  1000 1700  --  1200 1200    Emesis/NG output  --  100 100  --  -- --    Drains  --  200 200  145  75 220    Blood  100  -- 100  --  -- --    Total Output 800 1300 2100 145 1275 1420            LABS:      DIAGNOSTICS:  PATIENT ID #:   [id number]  ORDER #:   [id number]  EXAM:   XR KNEE 1 OR 2 VW LEFT  [facility]  [address] [place] [address] [phone]  XR KNEE 1 OR 2 VW LEFT- POSTOP PORTABLE 2 VIEWS LEFT KNEE  CLINICAL INFORMATION: Post arthroplasty  FINDINGS: Prosthesis is satisfactory in position. A complicating process  is not identified.  This report was finalized on 4/19/2023 3:29 PM by  Dr. Zen Ma M.D.  Signed by: Zen Ma MD on 4/19/2023 3:29 PM      ASSESSMENT:  Status post Procedure(s):  CONVERSION LEFT PARTIAL KNEE REPLACEMENT TO TOTAL KNEE REPLACEMENT  , POD #2    Plan:    Continue Physical Therapy, increase mobility and range of motion as tolerated.  WBAT to the operative extremity  Continue SCDs & DVT prophylaxis    75 cc output from Hemovac drain overnight.  Please remove Hemovac drain prior to discharge.    Aspirin 81 mg BID for VTE chemoprophylaxis    Dispo planning for home with home health likely today if meeting physical therapy goals medically stable.      Ori العراقي III, PA-C    Date: 4/21/2023  Time: 07:39 EDT

## 2023-04-22 LAB
BACTERIA SPEC AEROBE CULT: NORMAL
GRAM STN SPEC: NORMAL
GRAM STN SPEC: NORMAL

## 2023-04-22 NOTE — OUTREACH NOTE
Prep Survey    Flowsheet Row Responses   Restorationist facility patient discharged from? [place]   Is LACE score < 7 ? Yes   Eligibility [facility]   Date of Admission 04/19/23   Date of Discharge 04/21/23   Discharge Disposition Home-Health Care Sv   Discharge diagnosis Failed arthroplasty-conversion left partial knee replacement   Does the patient have one of the following disease processes/diagnoses(primary or secondary)? Total Joint Replacement   Does the patient have Home health ordered? Yes   What is the Home health agency?  [facility]    Is there a DME ordered? No   Prep survey completed? Yes          BRANDT JORGENSEN - Registered Nurse

## 2023-04-24 ENCOUNTER — TRANSITIONAL CARE MANAGEMENT TELEPHONE ENCOUNTER (OUTPATIENT)
Dept: CALL CENTER | Facility: HOSPITAL | Age: 58
End: 2023-04-24
Payer: COMMERCIAL

## 2023-04-24 LAB — BACTERIA SPEC ANAEROBE CULT: NORMAL

## 2023-04-24 NOTE — OUTREACH NOTE
Call Center TCM Note    Flowsheet Row Responses   [facility] patient discharged from? [place]   Does the patient have one of the following disease processes/diagnoses(primary or secondary)? Total Joint Replacement   Joint surgery performed? Knee   TCM attempt successful? Yes   Call start time 1417   Call end time 1419   Has the patient been back in either the hospital or Emergency Department since discharge? No   Does the patient have all medications related to this admission filled (includes all antibiotics, pain medications, etc.) Yes   Is the patient taking all medications as directed (includes completed medication regime)? Yes   Is the patient able to teach back alternate methods of pain control? Ice, Reposition, Correct alignment, Knee-elevation/no pillow under knee, Shoulder-elevate above heart/ keep in sling as advised, Short, frequent activity   Does the patient have an appointment with their PCP within 7 days of discharge? No   Nursing Interventions Patient desires to follow up with specialty only, Routed TCM call to PCP office   Has home health visited the patient within 72 hours of discharge? Yes   Home health comments [facility]    Psychosocial issues? No   Has the patient began therapy sessions (either in the home or as an out patient)? Yes   Does the patient have a wound vac in place? N/A   Has the patient fallen since discharge? No   Did the patient receive a copy of their discharge instructions? Yes   Nursing interventions Reviewed instructions with patient   What is the patient's perception of their functional status since discharge? Improving   Is the patient able to teach back signs and symptoms of infection? Temp >100.4 for 24h or longer, Blisters around incision, Severe discomfort or pain, Shortness of breath or chest pain, Changes in mobility, Increased swelling or redness around incision (not associated with surgical edema), Incisional drainage   Is the patient able to teach back how to  prevent infection? Check incision daily, Shower only as directed by surgeon, No lotion or creams, Monitor blood sugar if diabetic, Wash hands before and after touching incision, Keep incision covered if drainage, Eat well-balanced diet, No tub baths, hot tub or swimming, Keep incision covered if pets in house   Is the patient able to teach back signs and symptoms of DVT? Redness in calf, Swelling in calf, Area hot to touch, Severe pain in calf, Shortness of breath or chest pain   Is the patient able to teach back home safety measures? Modifications to reach items, Modifications with ADLs such as dressing, cooking, toileting, Accessibility to necessary areas in home, Ability to shower   Did the patient implement home safety suggestions from pre-surgery classes if attended? N/A   If the patient is a current smoker, are they able to teach back resources for cessation? Not a smoker   Is the patient/caregiver able to teach back the hierarchy of who to call/visit for symptoms/problems? PCP, Specialist, Home health nurse, Urgent Care, ED, 911 Yes   TCM call completed? Yes   Wrap up additional comments D/C DX: Conversion of Praveen to Total Lt Knee Replacement - Pt doing very well. [facility] following. All needed medicaitons in place. No questions. First POST OP is 05/12/2023, pt will call for ov with PCP Dr Katz after this appt.   Call end time [id number]          Mahi Christie MA    4/24/2023, 14:28 EDT

## 2023-05-30 RX ORDER — ALBUTEROL SULFATE 90 UG/1
AEROSOL, METERED RESPIRATORY (INHALATION)
Qty: 8.5 G | Refills: 1 | Status: SHIPPED | OUTPATIENT
Start: 2023-05-30

## 2023-06-05 ENCOUNTER — OFFICE VISIT (OUTPATIENT)
Dept: FAMILY MEDICINE CLINIC | Facility: CLINIC | Age: 58
End: 2023-06-05
Payer: COMMERCIAL

## 2023-06-05 VITALS
TEMPERATURE: 97.1 F | HEART RATE: 75 BPM | BODY MASS INDEX: 34.32 KG/M2 | WEIGHT: 193.7 LBS | OXYGEN SATURATION: 99 % | DIASTOLIC BLOOD PRESSURE: 106 MMHG | SYSTOLIC BLOOD PRESSURE: 150 MMHG | HEIGHT: 63 IN

## 2023-06-05 DIAGNOSIS — I10 ESSENTIAL HYPERTENSION: Primary | ICD-10-CM

## 2023-06-05 PROCEDURE — 99213 OFFICE O/P EST LOW 20 MIN: CPT | Performed by: FAMILY MEDICINE

## 2023-06-05 RX ORDER — LOSARTAN POTASSIUM AND HYDROCHLOROTHIAZIDE 12.5; 5 MG/1; MG/1
1 TABLET ORAL DAILY
Qty: 90 TABLET | Refills: 1 | Status: SHIPPED | OUTPATIENT
Start: 2023-06-05

## 2023-06-05 RX ORDER — ASPIRIN 81 MG/1
81 TABLET ORAL DAILY
COMMUNITY
Start: 2023-04-20

## 2023-06-05 NOTE — PROGRESS NOTES
"Chief Complaint  Hypertension    Subjective        Jennifer Samayoa presents to [facility]  History of Present Illness    Follow-up elevated blood pressure readings.  Probable hypertension.  She has had ongoing elevated blood pressure readings at home.  Especially she states when her daughter is in town.  No cardiovascular symptoms.  She had her knee replaced.  She states she is doing well.  Continuing therapy.  She going back to work part-time.  She has had some allergy cold symptoms recently, taking over-the-counter Sudafed.  Otherwise doing well.    Objective   Vital Signs:  BP (!) 150/106   Pulse 75   Temp 97.1 °F (36.2 °C) (Temporal)   Ht 160 cm (63\")   Wt 87.9 kg (193 lb 11.2 oz)   SpO2 99%   BMI 34.31 kg/m²   Estimated body mass index is 34.31 kg/m² as calculated from the following:    Height as of this encounter: 160 cm (63\").    Weight as of this encounter: 87.9 kg (193 lb 11.2 oz).             Physical Exam  Vitals and nursing note reviewed.   Constitutional:       General: She is not in acute distress.     Appearance: She is well-developed.   Cardiovascular:      Rate and Rhythm: Normal rate and regular rhythm.      Heart sounds: Normal heart sounds.   Pulmonary:      Effort: Pulmonary effort is normal.      Breath sounds: Normal breath sounds.   Abdominal:      Palpations: There is no mass.      Tenderness: There is no abdominal tenderness.      Comments: No renal bruits   Skin:     General: Skin is warm and dry.   Psychiatric:         Mood and Affect: Mood normal.      Result Review :  The following data was reviewed by: Louie Katz MD on 06/05/2023:  Common labs          3/31/2023    10:28 4/20/2023    05:50 4/21/2023    05:41   Common Labs   Glucose 100      BUN 12      Creatinine 0.81      Sodium 142      Potassium 4.3      Chloride 107      Calcium 10.1      Albumin 4.3      Total Bilirubin 0.7      Alkaline Phosphatase 136      AST (SGOT) 13      ALT " (SGPT) 14      WBC 6.43      Hemoglobin 12.8  10.1  10.4    Hematocrit 39.3  29.8  31.2    Platelets 226      Hemoglobin A1C 5.10                     Assessment and Plan   Diagnoses and all orders for this visit:    1. Essential hypertension (Primary)  -     Comprehensive Metabolic Panel; Future    Other orders  -     losartan-hydrochlorothiazide (Hyzaar) 50-12.5 MG per tablet; Take 1 tablet by mouth Daily.  Dispense: 90 tablet; Refill: 1      Hypertension.  New diagnosis.  No immediate further work-up needed.  Starting losartan hydrochlorothiazide but I want her to take 1/2 tablet a day.  She is aware of this.  I wrote down instructions.  She will be checking her blood pressure daily.  I will see her back in 6 weeks.  If blood pressure remains high, or gets too low she is going to let me know.  Discussed side effects including potential increased urination.  Checking lab work prior to upcoming visit.  Creatinine and potassium 3 months ago was normal.           Follow Up   No follow-ups on file.  Patient was given instructions and counseling regarding her condition or for health maintenance advice. Please see specific information pulled into the AVS if appropriate.

## 2023-08-28 RX ORDER — ALBUTEROL SULFATE 90 UG/1
AEROSOL, METERED RESPIRATORY (INHALATION)
Qty: 8.5 G | Refills: 1 | Status: SHIPPED | OUTPATIENT
Start: 2023-08-28

## 2023-10-04 RX ORDER — ALBUTEROL SULFATE 90 UG/1
AEROSOL, METERED RESPIRATORY (INHALATION)
Qty: 8.5 G | Refills: 1 | Status: SHIPPED | OUTPATIENT
Start: 2023-10-04

## 2023-10-04 NOTE — TELEPHONE ENCOUNTER
Rx Refill Note  Requested Prescriptions     Pending Prescriptions Disp Refills    albuterol sulfate  (90 Base) MCG/ACT inhaler [Pharmacy Med Name: ALBUTEROL HFA INH (200 PUFFS) 8.5GM] 8.5 g 1     Sig: INHALE 2 PUFFS INTO THE LUNGS EVERY 4 HOURS AS NEEDED FOR WHEEZING      Last office visit with prescribing clinician: 7/17/2023   Last telemedicine visit with prescribing clinician: Visit date not found   Next office visit with prescribing clinician: 3/15/2024                         Would you like a call back once the refill request has been completed: [] Yes [] No    If the office needs to give you a call back, can they leave a voicemail: [] Yes [] No    Kamryn Beckman  10/04/23, 07:49 EDT

## 2023-10-30 RX ORDER — ALBUTEROL SULFATE 90 UG/1
AEROSOL, METERED RESPIRATORY (INHALATION)
Qty: 8.5 G | Refills: 1 | Status: SHIPPED | OUTPATIENT
Start: 2023-10-30

## 2023-10-30 NOTE — TELEPHONE ENCOUNTER
Rx Refill Note  Requested Prescriptions     Pending Prescriptions Disp Refills    albuterol sulfate  (90 Base) MCG/ACT inhaler [Pharmacy Med Name: ALBUTEROL HFA INH (200 PUFFS) 8.5GM] 8.5 g 1     Sig: INHALE 2 PUFFS INTO THE LUNGS EVERY 4 HOURS AS NEEDED FOR WHEEZING      Last office visit with prescribing clinician: 7/17/2023   Last telemedicine visit with prescribing clinician: Visit date not found   Next office visit with prescribing clinician: 3/15/2024                         Would you like a call back once the refill request has been completed: [] Yes [] No    If the office needs to give you a call back, can they leave a voicemail: [] Yes [] No    Kamryn Beckman  10/30/23, 16:39 EDT

## 2023-11-26 RX ORDER — ALBUTEROL SULFATE 90 UG/1
AEROSOL, METERED RESPIRATORY (INHALATION)
Qty: 8.5 G | Refills: 1 | Status: SHIPPED | OUTPATIENT
Start: 2023-11-26

## 2024-01-02 RX ORDER — ALBUTEROL SULFATE 90 UG/1
AEROSOL, METERED RESPIRATORY (INHALATION)
Qty: 8.5 G | Refills: 1 | Status: SHIPPED | OUTPATIENT
Start: 2024-01-02

## 2024-01-02 NOTE — TELEPHONE ENCOUNTER
Rx Refill Note  Requested Prescriptions     Pending Prescriptions Disp Refills    albuterol sulfate  (90 Base) MCG/ACT inhaler [Pharmacy Med Name: ALBUTEROL HFA INH (200 PUFFS) 8.5GM] 8.5 g 1     Sig: INHALE 2 PUFFS INTO THE LUNGS EVERY 4 HOURS AS NEEDED FOR WHEEZING      Last office visit with prescribing clinician: 7/17/2023   Last telemedicine visit with prescribing clinician: Visit date not found   Next office visit with prescribing clinician: 3/15/2024                         Would you like a call back once the refill request has been completed: [] Yes [] No    If the office needs to give you a call back, can they leave a voicemail: [] Yes [] No    Navneet Lal MA  01/02/24, 08:39 EST

## 2024-03-11 ENCOUNTER — OFFICE VISIT (OUTPATIENT)
Dept: FAMILY MEDICINE CLINIC | Facility: CLINIC | Age: 59
End: 2024-03-11
Payer: COMMERCIAL

## 2024-03-11 VITALS
TEMPERATURE: 96.7 F | HEART RATE: 81 BPM | OXYGEN SATURATION: 96 % | DIASTOLIC BLOOD PRESSURE: 78 MMHG | BODY MASS INDEX: 26.15 KG/M2 | SYSTOLIC BLOOD PRESSURE: 104 MMHG | WEIGHT: 153.2 LBS | HEIGHT: 64 IN

## 2024-03-11 DIAGNOSIS — Z00.00 HEALTH CARE MAINTENANCE: Primary | ICD-10-CM

## 2024-03-11 DIAGNOSIS — I10 ESSENTIAL HYPERTENSION: ICD-10-CM

## 2024-03-11 PROCEDURE — 99396 PREV VISIT EST AGE 40-64: CPT | Performed by: FAMILY MEDICINE

## 2024-03-11 NOTE — PROGRESS NOTES
"Chief Complaint  Annual Exam (Physical Exam )    Subjective        Jennifer Samayoa presents to [facility]  History of Present Illness    Annual healthcare maintenance visit.  Non-smoker.  No alcohol use.  She maintains physical activity.  She is lost about 40 to 50 pounds through exercise and diet.  Primarily eating much more healthy.  Watching her diet.  Maintaining hydration.  States she feels good.  She is up-to-date with her gynecologist.  Recent lab work was overall favorable with exception of slightly higher cholesterol.  No evidence of diabetes or prediabetes.  Creatinine normal.  Liver enzymes normal.  She checks her blood pressure at home.  Runs about 120s systolic.  Checks it a couple times a week.    Objective   Vital Signs:  /78 (BP Location: Left arm, Patient Position: Sitting, Cuff Size: Adult)   Pulse 81   Temp 96.7 °F (35.9 °C)   Ht 161.3 cm (63.5\")   Wt 69.5 kg (153 lb 3.2 oz)   SpO2 96%   BMI 26.71 kg/m²   Estimated body mass index is 26.71 kg/m² as calculated from the following:    Height as of this encounter: 161.3 cm (63.5\").    Weight as of this encounter: 69.5 kg (153 lb 3.2 oz).             Physical Exam  Constitutional:       Appearance: Normal appearance.   HENT:      Head: Atraumatic.      Mouth/Throat:      Pharynx: Oropharynx is clear. No oropharyngeal exudate or posterior oropharyngeal erythema.   Eyes:      Conjunctiva/sclera: Conjunctivae normal.   Neck:      Comments: Thyroid examination unremarkable  Cardiovascular:      Rate and Rhythm: Normal rate and regular rhythm.      Pulses: Normal pulses.      Heart sounds: Normal heart sounds.   Pulmonary:      Effort: Pulmonary effort is normal.      Breath sounds: Normal breath sounds.   Abdominal:      General: Abdomen is flat. There is no distension.      Palpations: Abdomen is soft. There is no mass.      Tenderness: There is no abdominal tenderness.      Hernia: No hernia is present. "   Musculoskeletal:         General: Normal range of motion.      Cervical back: Normal range of motion and neck supple. No muscular tenderness.   Lymphadenopathy:      Cervical: No cervical adenopathy.   Skin:     General: Skin is warm and dry.      Findings: No rash.   Neurological:      General: No focal deficit present.      Mental Status: She is alert and oriented to person, place, and time.   Psychiatric:         Mood and Affect: Mood normal.        Result Review :    The following data was reviewed by: Louie Katz MD on 03/11/2024:  Common labs          4/21/2023    05:41 7/10/2023    12:08 2/26/2024    10:54   Common Labs   Glucose  89  83    BUN  12  14    Creatinine  0.87  0.91    Sodium  140  140    Potassium  4.7  4.5    Chloride  105  104    Calcium  10.3  9.7    Total Protein  7.7  7.2    Albumin  4.9  4.5    Total Bilirubin  0.4  0.5    Alkaline Phosphatase  137  111    AST (SGOT)  18  14    ALT (SGPT)  20  11    WBC   6.42    Hemoglobin 10.4   12.9    Hematocrit 31.2   41.1    Platelets   250    Total Cholesterol   211    Triglycerides   104    HDL Cholesterol   52    LDL Cholesterol    140                   Assessment and Plan     Diagnoses and all orders for this visit:    1. Health care maintenance (Primary)  -     CBC & Differential; Future  -     Comprehensive Metabolic Panel; Future  -     Lipid Panel; Future  -     Urinalysis With Microscopic If Indicated (No Culture) - Urine, Clean Catch; Future    2. Essential hypertension      Annual healthcare maintenance visit.    Immunizations discussed.    Breast cancer screening up-to-date through her gynecologist.  She also continues her cervical cancer screening through them.    Colon cancer screening up-to-date.    Hypertension.  Very well-controlled.    Weight loss.  Through diet and exercise.  Likely not pathological weight loss.  I will see her back in 1 year.  Sooner as needed.  Other preventative health practices discussed.         Follow  Up     No follow-ups on file.  Patient was given instructions and counseling regarding her condition or for health maintenance advice. Please see specific information pulled into the AVS if appropriate.

## 2024-04-10 RX ORDER — ALBUTEROL SULFATE 90 UG/1
2 AEROSOL, METERED RESPIRATORY (INHALATION) EVERY 4 HOURS PRN
Qty: 8.5 G | Refills: 1 | Status: SHIPPED | OUTPATIENT
Start: 2024-04-10

## 2024-04-10 NOTE — TELEPHONE ENCOUNTER
Rx Refill Note  Requested Prescriptions     Pending Prescriptions Disp Refills    albuterol sulfate  (90 Base) MCG/ACT inhaler 8.5 g 1      Last office visit with prescribing clinician: 3/11/2024   Last telemedicine visit with prescribing clinician: Visit date not found   Next office visit with prescribing clinician: 3/12/2025                         Would you like a call back once the refill request has been completed: [] Yes [] No    If the office needs to give you a call back, can they leave a voicemail: [] Yes [] No    Kamryn Beckman  04/10/24, 15:07 EDT

## 2024-05-17 NOTE — TELEPHONE ENCOUNTER
Rx Refill Note  Requested Prescriptions     Pending Prescriptions Disp Refills    losartan-hydrochlorothiazide (HYZAAR) 50-12.5 MG per tablet [Pharmacy Med Name: LOSARTAN/HCTZ 50/12.5MG TABLETS] 90 tablet 1     Sig: TAKE 1 TABLET BY MOUTH DAILY      Last office visit with prescribing clinician: 3/11/2024   Last telemedicine visit with prescribing clinician: Visit date not found   Next office visit with prescribing clinician: 3/12/2025                         Would you like a call back once the refill request has been completed: [] Yes [] No    If the office needs to give you a call back, can they leave a voicemail: [] Yes [] No    Kamryn Beckman  05/17/24, 16:00 EDT

## 2024-05-20 RX ORDER — LOSARTAN POTASSIUM AND HYDROCHLOROTHIAZIDE 12.5; 5 MG/1; MG/1
1 TABLET ORAL DAILY
Qty: 90 TABLET | Refills: 1 | Status: SHIPPED | OUTPATIENT
Start: 2024-05-20

## 2024-06-25 RX ORDER — ALBUTEROL SULFATE 90 UG/1
2 AEROSOL, METERED RESPIRATORY (INHALATION) EVERY 4 HOURS PRN
Qty: 8.5 G | Refills: 1 | Status: SHIPPED | OUTPATIENT
Start: 2024-06-25

## 2024-06-25 NOTE — TELEPHONE ENCOUNTER
Rx Refill Note  Requested Prescriptions     Pending Prescriptions Disp Refills    albuterol sulfate  (90 Base) MCG/ACT inhaler [Pharmacy Med Name: ALBUTEROL HFA INH (200 PUFFS) 8.5GM] 8.5 g 1     Sig: INHALE 2 PUFFS BY MOUTH EVERY 4 HOURS AS NEEDED FOR WHEEZING      Last office visit with prescribing clinician: Visit date not found   Last telemedicine visit with prescribing clinician: Visit date not found   Next office visit with prescribing clinician: Visit date not found                         Would you like a call back once the refill request has been completed: [] Yes [] No    If the office needs to give you a call back, can they leave a voicemail: [] Yes [] No    Helena Paul MA  06/25/24, 08:08 EDT     (2) everted (after stimulation)

## 2024-08-01 RX ORDER — ALBUTEROL SULFATE 90 UG/1
2 AEROSOL, METERED RESPIRATORY (INHALATION) EVERY 4 HOURS PRN
Qty: 8.5 G | Refills: 1 | Status: SHIPPED | OUTPATIENT
Start: 2024-08-01

## 2024-09-16 RX ORDER — ALBUTEROL SULFATE 90 UG/1
2 AEROSOL, METERED RESPIRATORY (INHALATION) EVERY 4 HOURS PRN
Qty: 8.5 G | Refills: 2 | Status: SHIPPED | OUTPATIENT
Start: 2024-09-16

## 2024-12-09 RX ORDER — ALBUTEROL SULFATE 90 UG/1
2 INHALANT RESPIRATORY (INHALATION) EVERY 4 HOURS PRN
Qty: 8.5 G | Refills: 2 | Status: SHIPPED | OUTPATIENT
Start: 2024-12-09

## 2024-12-09 NOTE — TELEPHONE ENCOUNTER
Rx Refill Note  Requested Prescriptions     Pending Prescriptions Disp Refills    albuterol sulfate  (90 Base) MCG/ACT inhaler [Pharmacy Med Name: ALBUTEROL HFA INH (200 PUFFS) 8.5GM] 8.5 g 2     Sig: INHALE 2 PUFFS BY MOUTH EVERY 4 HOURS AS NEEDED FOR WHEEZING      Last office visit with prescribing clinician: 3/11/2024   Last telemedicine visit with prescribing clinician: Visit date not found   Next office visit with prescribing clinician: 3/12/2025                         Would you like a call back once the refill request has been completed: [] Yes [] No    If the office needs to give you a call back, can they leave a voicemail: [] Yes [] No    Kamryn Beckman  12/09/24, 08:48 EST

## 2025-02-21 NOTE — TELEPHONE ENCOUNTER
Rx Refill Note  Requested Prescriptions     Pending Prescriptions Disp Refills    losartan-hydrochlorothiazide (HYZAAR) 50-12.5 MG per tablet [Pharmacy Med Name: LOSARTAN/HCTZ 50/12.5MG TABLETS] 90 tablet 1     Sig: TAKE 1 TABLET BY MOUTH DAILY      Last office visit with prescribing clinician: 3/11/2024   Last telemedicine visit with prescribing clinician: Visit date not found   Next office visit with prescribing clinician: 3/12/2025                         Would you like a call back once the refill request has been completed: [] Yes [] No    If the office needs to give you a call back, can they leave a voicemail: [] Yes [] No    Kamryn Beckman  02/21/25, 15:33 EST

## 2025-02-24 RX ORDER — LOSARTAN POTASSIUM AND HYDROCHLOROTHIAZIDE 12.5; 5 MG/1; MG/1
1 TABLET ORAL DAILY
Qty: 90 TABLET | Refills: 1 | Status: SHIPPED | OUTPATIENT
Start: 2025-02-24

## 2025-03-05 ENCOUNTER — TELEPHONE (OUTPATIENT)
Dept: FAMILY MEDICINE CLINIC | Facility: CLINIC | Age: 60
End: 2025-03-05
Payer: COMMERCIAL

## 2025-03-05 NOTE — TELEPHONE ENCOUNTER
Pt was sent to us in overdue results. Pt stated she doesn't have insurance. Pt is unable to make an appt w/ PCP and lab appt at this time.